# Patient Record
Sex: FEMALE | Race: WHITE | HISPANIC OR LATINO | Employment: OTHER | URBAN - METROPOLITAN AREA
[De-identification: names, ages, dates, MRNs, and addresses within clinical notes are randomized per-mention and may not be internally consistent; named-entity substitution may affect disease eponyms.]

---

## 2017-05-17 ENCOUNTER — HOSPITAL ENCOUNTER (EMERGENCY)
Facility: OTHER | Age: 57
Discharge: HOME | End: 2017-05-17
Attending: EMERGENCY MEDICINE
Payer: MEDICAID

## 2017-05-17 VITALS
TEMPERATURE: 99 F | BODY MASS INDEX: 32.25 KG/M2 | OXYGEN SATURATION: 94 % | WEIGHT: 182 LBS | HEIGHT: 63 IN | HEART RATE: 109 BPM | DIASTOLIC BLOOD PRESSURE: 75 MMHG | SYSTOLIC BLOOD PRESSURE: 134 MMHG | RESPIRATION RATE: 18 BRPM

## 2017-05-17 DIAGNOSIS — L03.211 FACIAL CELLULITIS: ICD-10-CM

## 2017-05-17 DIAGNOSIS — K04.7 DENTAL ABSCESS: Primary | ICD-10-CM

## 2017-05-17 PROCEDURE — 99283 EMERGENCY DEPT VISIT LOW MDM: CPT

## 2017-05-17 PROCEDURE — 25000003 PHARM REV CODE 250: Performed by: PHYSICIAN ASSISTANT

## 2017-05-17 RX ORDER — PENICILLIN V POTASSIUM 500 MG/1
500 TABLET, FILM COATED ORAL 4 TIMES DAILY
Qty: 28 TABLET | Refills: 0 | Status: SHIPPED | OUTPATIENT
Start: 2017-05-17 | End: 2017-05-24

## 2017-05-17 RX ORDER — IBUPROFEN 600 MG/1
600 TABLET ORAL EVERY 6 HOURS PRN
Qty: 20 TABLET | Refills: 0 | Status: SHIPPED | OUTPATIENT
Start: 2017-05-17

## 2017-05-17 RX ORDER — IBUPROFEN 600 MG/1
600 TABLET ORAL
Status: COMPLETED | OUTPATIENT
Start: 2017-05-17 | End: 2017-05-17

## 2017-05-17 RX ADMIN — IBUPROFEN 600 MG: 600 TABLET, FILM COATED ORAL at 09:05

## 2017-05-17 NOTE — ED TRIAGE NOTES
Patient c/o left lower dental pain and swelling since waking up this morning.  Patients caretaker brought patient in for evaluation.  No airway or breathing complaints.

## 2017-05-17 NOTE — ED NOTES
Discharge papers and prescriptions reviewed with patient and caregiver.  Patient & caregiver verbalized understanding.  Patient directed to discharge window.  Paperwork given to registration.

## 2017-05-17 NOTE — ED AVS SNAPSHOT
[facility]  [address]  [facility] [address]               Jazz Hernandez   2017  8:59 AM   ED    Description:  Female : 1960   Department:  [facility]           Your Care was Coordinated By:     Provider Role From To    Benjamin Zhu MD Attending Provider 1706 --    Cece Charles PA-C Physician Assistant 17 --      Reason for Visit     Dental Pain     Facial Swelling           Diagnoses this Visit        Comments    Dental abscess    -  Primary     Facial cellulitis           ED Disposition     None           To Do List           Follow-up Information     Follow up with [facility] In 2 days.    Specialties:  Behavioral Health, Psychiatry    Contact information:    [address] [address]  [phone]          Follow up with [facility].    Specialty:  Emergency Medicine    Why:  If symptoms worsen    Contact information:    [address] [facility] [address] [phone]       These Medications        Disp Refills Start End    penicillin v potassium (VEETID) 500 MG tablet 28 tablet 0 2017    Take 1 tablet (500 mg total) by mouth 4 (four) times daily. - Oral    ibuprofen (ADVIL,MOTRIN) 600 MG tablet 20 tablet 0 2017     Take 1 tablet (600 mg total) by mouth every 6 (six) hours as needed for Pain. - Oral      [facility]     [facility] Nurse Care Line -  Assistance  Unless otherwise directed by your provider, please contact [facility], our nurse care line that is available for  assistance.     Registered nurses in the [facility] provide: appointment scheduling, clinical advisement, health education, and other advisory services.  Call: [phone] (toll free)               Medications           START taking these NEW medications        Refills    penicillin v potassium (VEETID) 500 MG tablet  "0    Sig: Take 1 tablet (500 mg total) by mouth 4 (four) times daily.    Class: Print    Route: Oral    ibuprofen (ADVIL,MOTRIN) 600 MG tablet 0    Sig: Take 1 tablet (600 mg total) by mouth every 6 (six) hours as needed for Pain.    Class: Print    Route: Oral           Verify that the below list of medications is an accurate representation of the medications you are currently taking.  If none reported, the list may be blank. If incorrect, please contact your healthcare provider. Carry this list with you in case of emergency.           Current Medications     AMLODIPINE BESYLATE (NORVASC ORAL) Take by mouth.    benztropine (COGENTIN) 0.5 MG tablet Take 0.5 mg by mouth 2 (two) times daily.    ibuprofen (ADVIL,MOTRIN) 600 MG tablet Take 1 tablet (600 mg total) by mouth every 6 (six) hours as needed for Pain.    PALIPERIDONE PALMITATE (INVEGA SUSTENNA IM) Inject into the muscle.    penicillin v potassium (VEETID) 500 MG tablet Take 1 tablet (500 mg total) by mouth 4 (four) times daily.    triamcinolone acetonide 0.1% (KENALOG) 0.1 % ointment Apply topically 2 (two) times daily. Use until symptoms resolve.           Clinical Reference Information           Your Vitals Were     BP Pulse Temp Resp Height Weight    134/75 (BP Location: Left arm, Patient Position: Sitting) 109 98.8 °F (37.1 °C) (Oral) 18 5' 3" (1.6 m) 82.6 kg (182 lb)    SpO2 BMI             94% 32.24 kg/m2         Allergies as of 5/17/2017     No Known Allergies      Immunizations Administered on Date of Encounter - 5/17/2017     None      ED Micro, Lab, POCT     None      ED Imaging Orders     None      Discharge References/Attachments     DENTAL ABSCESS WITH FACIAL CELLULITIS (ENGLISH)      [facility] Sign-Up     Activating your [facility] account is as easy as 1-2-3!     1) Visit [email or web address], select Sign Up Now, enter this activation code and your date of birth, then select Next.  [email or web address]  Expires: 7/1/2017  9:15 AM      2) Create a username " and password to use when you visit [facility] in the future and select a security question in case you lose your password and select Next.    3) Enter your e-mail address and click Sign Up!    Additional Information  If you have questions, please e-mail [email or web address] or call [phone] to talk to our [facility] staff. Remember, [facility] is NOT to be used for urgent needs. For medical emergencies, dial 911.         Smoking Cessation     If you would like to quit smoking:   You may be eligible for free services if you are a [place] resident and started smoking cigarettes before September 1, 1988.  Call the Smoking Cessation Trust ([facility]) toll free at ([phone] or [phone].   Call 2-737-QUIT-NOW if you do not meet the above criteria.   Contact us via email: [email or web address]   View our website for more information: [email or web address]         [facility] complies with applicable Federal civil rights laws and does not discriminate on the basis of race, color, national origin, age, disability, or sex.        Language Assistance Services     ATTENTION: Language assistance services are available, free of charge. Please call [phone].      ATENCIÓN: Si habla telly, tiene a acosta disposición servicios gratuitos de asistencia lingüística. Llame al [phone].     CHÚ Ý: N?u b?n nói Ti?ng Vi?t, có các d?ch v? h? tr? ngôn ng? mi?n phí dành cho b?n. G?i s? [phone].

## 2017-05-17 NOTE — ED PROVIDER NOTES
Encounter Date: 5/17/2017       History     Chief Complaint   Patient presents with    Dental Pain     caretaker reports pt waking up this morning with left side of face swollen; pt has been c/o dental pain for the past few days    Facial Swelling     Review of patient's allergies indicates:  No Known Allergies  HPI Comments: Patient is a 57 y.o. female presenting to the emergency department with complaints of left-sided dental pain and facial swelling.  The patient's caretaker reports that yesterday she started complaining of left-sided dental pain to the lower side.  She states that when the patient awoke this morning, she had significant swelling to the face.  The patient states that she has a known dental fracture, and is in the process of getting into a dentist.  She denies difficulty swallowing, handling oral secretions, fever, chills.  The patient's caretaker states that they have been administering Tylenol with some relief.  She denies drainage in her mouth, denies swelling of her tongue or throat.    The history is provided by the patient.     Past Medical History:   Diagnosis Date    Depression      History reviewed. No pertinent surgical history.  History reviewed. No pertinent family history.  Social History   Substance Use Topics    Smoking status: Current Every Day Smoker    Smokeless tobacco: None    Alcohol use No     Review of Systems   Constitutional: Negative for activity change, appetite change, chills, fatigue and fever.   HENT: Positive for dental problem and facial swelling. Negative for congestion, rhinorrhea and sore throat.    Eyes: Negative for photophobia and visual disturbance.   Respiratory: Negative for cough, shortness of breath and wheezing.    Cardiovascular: Negative for chest pain.   Gastrointestinal: Negative for abdominal pain, diarrhea, nausea and vomiting.   Genitourinary: Negative for dysuria, hematuria and urgency.   Musculoskeletal: Negative for back pain, myalgias  and neck pain.   Skin: Negative for color change and wound.   Neurological: Negative for weakness and headaches.   Psychiatric/Behavioral: Negative for agitation and confusion.       Physical Exam   Initial Vitals   BP Pulse Resp Temp SpO2   05/17/17 0846 05/17/17 0846 05/17/17 0846 05/17/17 0846 05/17/17 0846   134/75 109 18 98.8 °F (37.1 °C) 94 %     Physical Exam    Nursing note and vitals reviewed.  Constitutional: She appears well-developed and well-nourished. She is not diaphoretic.  Non-toxic appearance. She does not have a sickly appearance. No distress.   Female accompanied by caretaker in the emergency department.  She is in no acute distress, speaking in clear and full sentences.  She makes eye contact.    HENT:   Head: Normocephalic and atraumatic.   Right Ear: External ear normal.   Left Ear: External ear normal.   Nose: Nose normal.   Mouth/Throat: Uvula is midline and oropharynx is clear and moist.       Overall poor dentition with many missing teeth and significant overall dental caries.  Tenderness to palpation of the left lower side with no palpable abscess. There is no palpable abscess, no trismus and no lingual elevation.  Edema of the left face with no submandibular edema.   Eyes: Conjunctivae and EOM are normal.   Neck: Normal range of motion. Neck supple.   Cardiovascular: Normal rate, regular rhythm and normal heart sounds.   Pulmonary/Chest: Breath sounds normal. No respiratory distress. She has no wheezes.   Musculoskeletal: Normal range of motion.   Neurological: She is alert and oriented to person, place, and time. GCS eye subscore is 4. GCS verbal subscore is 5. GCS motor subscore is 6.   Skin: Skin is warm.   Psychiatric: She has a normal mood and affect. Her behavior is normal. Judgment and thought content normal.         ED Course   Procedures  Labs Reviewed - No data to display          Medical Decision Making:   Other:   I have discussed this case with another health care  provider.       <> Summary of the Discussion: Dr. Carreon  This note was created using Dragon Medical Dictation. There may be typographical errors secondary to dictation.     Urgent evaluation of a 57 y.o. female presenting to the emergency department complaining of left sided dental pain. Patient is afebrile, nontoxic appearing and hemodynamically stable.  Physical exam reveals regular rate and rhythm, lungs are clear to auscultation bilaterally.  Left-sided facial swelling.  Overall significant poor dentition with multiple missing teeth and dental caries.  Tenderness palpation of the left lower side with no palpable abscess. There are no signs of Jesus's angina, lingual elevation, sublingual swelling, facial swelling, airway compromise, sepsis, dehydration, or a fluctuant abscess to drain.  The patient was given ibuprofen the emergency department.  She'll be discharged home with a prescription for ibuprofen and penicillin.  The patient and her caretaker were counseled on the importance of obtaining close follow-up with dentist.  Patient stable for discharge home. The patient was instructed to follow up with a primary care provider in 2 days or to return to the emergency department for worsening symptoms. The treatment plan was discussed with the patient who demonstrated understanding and comfort with plan. The patient's history, physical exam, and plan of care was discussed with and agreed upon with my supervising physician.     Past Medical History:   Diagnosis Date    Depression                      ED Course     Clinical Impression:     1. Dental abscess    2. Facial cellulitis       Disposition:   Disposition: Discharged  Condition: Stable       Cece Charles PA-C  05/17/17 0922

## 2019-08-26 ENCOUNTER — HOSPITAL ENCOUNTER (EMERGENCY)
Facility: OTHER | Age: 59
Discharge: HOME | End: 2019-08-26
Attending: EMERGENCY MEDICINE
Payer: MEDICAID

## 2019-08-26 VITALS
BODY MASS INDEX: 33.49 KG/M2 | WEIGHT: 182 LBS | RESPIRATION RATE: 18 BRPM | HEART RATE: 89 BPM | DIASTOLIC BLOOD PRESSURE: 97 MMHG | TEMPERATURE: 98 F | HEIGHT: 62 IN | OXYGEN SATURATION: 100 % | SYSTOLIC BLOOD PRESSURE: 136 MMHG

## 2019-08-26 DIAGNOSIS — L23.9 ALLERGIC DERMATITIS: Primary | ICD-10-CM

## 2019-08-26 LAB — POCT GLUCOSE: 80 MG/DL (ref 70–110)

## 2019-08-26 PROCEDURE — 82962 GLUCOSE BLOOD TEST: CPT

## 2019-08-26 PROCEDURE — 99284 EMERGENCY DEPT VISIT MOD MDM: CPT | Mod: 25

## 2019-08-26 PROCEDURE — 63600175 PHARM REV CODE 636 W HCPCS: Performed by: EMERGENCY MEDICINE

## 2019-08-26 PROCEDURE — 96372 THER/PROPH/DIAG INJ SC/IM: CPT

## 2019-08-26 RX ORDER — TRIAMCINOLONE ACETONIDE 1 MG/G
CREAM TOPICAL 2 TIMES DAILY
Qty: 80 G | Refills: 0 | Status: ON HOLD | OUTPATIENT
Start: 2019-08-26 | End: 2019-10-31 | Stop reason: HOSPADM

## 2019-08-26 RX ORDER — QUETIAPINE FUMARATE 300 MG/1
350 TABLET, FILM COATED ORAL
COMMUNITY

## 2019-08-26 RX ORDER — DIPHENHYDRAMINE HYDROCHLORIDE 50 MG/ML
25 INJECTION INTRAMUSCULAR; INTRAVENOUS
Status: COMPLETED | OUTPATIENT
Start: 2019-08-26 | End: 2019-08-26

## 2019-08-26 RX ORDER — HYDROXYZINE PAMOATE 50 MG/1
50 CAPSULE ORAL 4 TIMES DAILY
Qty: 30 CAPSULE | Refills: 0 | Status: ON HOLD | OUTPATIENT
Start: 2019-08-26 | End: 2019-10-31 | Stop reason: HOSPADM

## 2019-08-26 RX ORDER — DIVALPROEX SODIUM 500 MG/1
250 TABLET, DELAYED RELEASE ORAL EVERY 8 HOURS
COMMUNITY

## 2019-08-26 RX ORDER — METFORMIN HYDROCHLORIDE 500 MG/1
500 TABLET ORAL 2 TIMES DAILY WITH MEALS
COMMUNITY

## 2019-08-26 RX ORDER — METHYLPREDNISOLONE 4 MG/1
TABLET ORAL
Qty: 1 PACKAGE | Refills: 0 | Status: SHIPPED | OUTPATIENT
Start: 2019-08-26 | End: 2019-09-16

## 2019-08-26 RX ORDER — DEXAMETHASONE SODIUM PHOSPHATE 4 MG/ML
8 INJECTION, SOLUTION INTRA-ARTICULAR; INTRALESIONAL; INTRAMUSCULAR; INTRAVENOUS; SOFT TISSUE
Status: COMPLETED | OUTPATIENT
Start: 2019-08-26 | End: 2019-08-26

## 2019-08-26 RX ADMIN — DIPHENHYDRAMINE HYDROCHLORIDE 25 MG: 50 INJECTION, SOLUTION INTRAMUSCULAR; INTRAVENOUS at 10:08

## 2019-08-26 RX ADMIN — DEXAMETHASONE SODIUM PHOSPHATE 8 MG: 4 INJECTION, SOLUTION INTRAMUSCULAR; INTRAVENOUS at 10:08

## 2019-08-26 NOTE — ED PROVIDER NOTES
Encounter Date: 8/26/2019    SCRIBE #1 NOTE: Geraldo SOLARES am scribing for, and in the presence of, Dr. Bowling.       History     Chief Complaint   Patient presents with    Allergic Reaction     facial swelling x 1 week. denies any new medication. unimprovd with benadryl. pt unable to fully open mouth due to swelling. maintaining airway. denies SOB or difficulty swallowing     Time seen by provider: 9:55 AM    This is a 59 y.o. female who presents with complaint of allergic reaction that began approximately one week ago. The patient reports that she has been experiencing facial swelling and tightness. The facial swelling and tightness is causing her to be unable to open her mouth fully. She is also experiencing a rash to her neck and upper chest wall that is painful. The patient's nurse reports that she has been taking benadryl with no relief of her symptoms. She denies fever, sore throat, chest pain, shortness of breath, nausea, and dysuria.     The history is provided by the patient.     Review of patient's allergies indicates:  No Known Allergies  Past Medical History:   Diagnosis Date    Depression      No past surgical history on file.  No family history on file.  Social History     Tobacco Use    Smoking status: Current Every Day Smoker   Substance Use Topics    Alcohol use: No    Drug use: No     Review of Systems   Constitutional: Negative for fever.   HENT: Positive for facial swelling (and tightness). Negative for sore throat.    Respiratory: Negative for shortness of breath.    Cardiovascular: Negative for chest pain.   Gastrointestinal: Negative for nausea.   Genitourinary: Negative for dysuria.   Musculoskeletal: Negative for back pain.   Skin: Positive for rash (to the neck and chest wall). Negative for color change and wound.   Neurological: Negative for weakness.   Hematological: Does not bruise/bleed easily.   All other systems reviewed and are negative.      Physical Exam     Initial Vitals  [08/26/19 0932]   BP Pulse Resp Temp SpO2   (Abnormal) 136/97 89 18 97.7 °F (36.5 °C) 100 %      MAP       --         Physical Exam    Nursing note and vitals reviewed.  Constitutional: She appears well-developed and well-nourished. No distress.   HENT:   Head: Normocephalic and atraumatic.   Mouth/Throat: Oropharynx is clear and moist.   Mild periorbital edema.    Eyes: Conjunctivae and EOM are normal. Pupils are equal, round, and reactive to light. Right eye exhibits no discharge. Left eye exhibits no discharge. No scleral icterus.   No eye involvement.    Neck: Normal range of motion. Neck supple.   Cardiovascular: Normal rate, regular rhythm and normal heart sounds. Exam reveals no gallop and no friction rub.    No murmur heard.  Pulmonary/Chest: Breath sounds normal. No stridor. No respiratory distress. She has no wheezes. She has no rhonchi. She has no rales.   Abdominal: Soft. Bowel sounds are normal. She exhibits no distension and no mass. There is no tenderness. There is no rebound and no guarding.   Musculoskeletal: She exhibits no edema.   Neurological: She is alert and oriented to person, place, and time. She has normal strength. No cranial nerve deficit or sensory deficit. GCS score is 15. GCS eye subscore is 4. GCS verbal subscore is 5. GCS motor subscore is 6.   Skin: Skin is warm and dry. Capillary refill takes less than 2 seconds.   Erythema and skin thickening of the face and left anterior neck. With celation of the lips.    Psychiatric: She has a normal mood and affect. Her behavior is normal. Judgment and thought content normal.         ED Course   Procedures  Labs Reviewed   POCT GLUCOSE          Imaging Results    None          Medical Decision Making:   Initial Assessment:   59 y.o female with localized allergic reaction to face and neck. Possible exposure for contact dermatitis. Some improvement with antihistamines. Plan to treat with steroids, topical steroids, and continue with  antihistamines.   Clinical Tests:   Lab Tests: Ordered and Reviewed            Scribe Attestation:   Scribe #1: I performed the above scribed service and the documentation accurately describes the services I performed. I attest to the accuracy of the note.    Attending Attestation:           Physician Attestation for Scribe:  Physician Attestation Statement for Scribe #1: I, Dr. Bowling, reviewed documentation, as scribed by Geraldo robledo  in my presence, and it is both accurate and complete.                    Clinical Impression:     1. Allergic dermatitis                                 Saranya Bowling MD  08/27/19 2035

## 2019-08-30 LAB — POCT GLUCOSE: 151 MG/DL (ref 70–110)

## 2019-10-28 ENCOUNTER — HOSPITAL ENCOUNTER (INPATIENT)
Facility: OTHER | Age: 59
LOS: 3 days | Discharge: HOME | DRG: 607 | End: 2019-10-31
Attending: EMERGENCY MEDICINE | Admitting: EMERGENCY MEDICINE
Payer: MEDICARE

## 2019-10-28 DIAGNOSIS — T50.905A ADVERSE EFFECT OF DRUG, INITIAL ENCOUNTER: Primary | ICD-10-CM

## 2019-10-28 DIAGNOSIS — L24.9 IRRITANT CONTACT DERMATITIS, UNSPECIFIED TRIGGER: ICD-10-CM

## 2019-10-28 LAB
ALBUMIN SERPL BCP-MCNC: 3.7 G/DL (ref 3.5–5.2)
ALP SERPL-CCNC: 85 U/L (ref 55–135)
ALT SERPL W/O P-5'-P-CCNC: 19 U/L (ref 10–44)
AMPHET+METHAMPHET UR QL: NEGATIVE
ANION GAP SERPL CALC-SCNC: 11 MMOL/L (ref 8–16)
AST SERPL-CCNC: 15 U/L (ref 10–40)
BARBITURATES UR QL SCN>200 NG/ML: NEGATIVE
BASOPHILS # BLD AUTO: 0.05 K/UL (ref 0–0.2)
BASOPHILS NFR BLD: 0.3 % (ref 0–1.9)
BENZODIAZ UR QL SCN>200 NG/ML: NEGATIVE
BILIRUB SERPL-MCNC: 0.3 MG/DL (ref 0.1–1)
BILIRUB UR QL STRIP: NEGATIVE
BUN SERPL-MCNC: 6 MG/DL (ref 6–20)
BZE UR QL SCN: NEGATIVE
CALCIUM SERPL-MCNC: 9.5 MG/DL (ref 8.7–10.5)
CANNABINOIDS UR QL SCN: NEGATIVE
CHLORIDE SERPL-SCNC: 104 MMOL/L (ref 95–110)
CK SERPL-CCNC: 116 U/L (ref 20–180)
CLARITY UR: CLEAR
CO2 SERPL-SCNC: 25 MMOL/L (ref 23–29)
COLOR UR: YELLOW
CREAT SERPL-MCNC: 0.7 MG/DL (ref 0.5–1.4)
CREAT UR-MCNC: 17.4 MG/DL (ref 15–325)
CRP SERPL-MCNC: 14.7 MG/L (ref 0–8.2)
DIFFERENTIAL METHOD: ABNORMAL
EOSINOPHIL # BLD AUTO: 1 K/UL (ref 0–0.5)
EOSINOPHIL NFR BLD: 6.5 % (ref 0–8)
ERYTHROCYTE [DISTWIDTH] IN BLOOD BY AUTOMATED COUNT: 13.2 % (ref 11.5–14.5)
ERYTHROCYTE [SEDIMENTATION RATE] IN BLOOD: 18 MM/HR (ref 0–20)
EST. GFR  (AFRICAN AMERICAN): >60 ML/MIN/1.73 M^2
EST. GFR  (NON AFRICAN AMERICAN): >60 ML/MIN/1.73 M^2
GLUCOSE SERPL-MCNC: 95 MG/DL (ref 70–110)
GLUCOSE UR QL STRIP: NEGATIVE
HCT VFR BLD AUTO: 36.7 % (ref 37–48.5)
HGB BLD-MCNC: 12 G/DL (ref 12–16)
HGB UR QL STRIP: NEGATIVE
IMM GRANULOCYTES # BLD AUTO: 0.09 K/UL (ref 0–0.04)
IMM GRANULOCYTES NFR BLD AUTO: 0.6 % (ref 0–0.5)
INR PPP: 0.9 (ref 0.8–1.2)
KETONES UR QL STRIP: NEGATIVE
LACTATE SERPL-SCNC: 2.2 MMOL/L (ref 0.5–2.2)
LEUKOCYTE ESTERASE UR QL STRIP: NEGATIVE
LITHIUM SERPL-SCNC: <0.1 MMOL/L (ref 0.6–1.2)
LYMPHOCYTES # BLD AUTO: 3.1 K/UL (ref 1–4.8)
LYMPHOCYTES NFR BLD: 20.2 % (ref 18–48)
MCH RBC QN AUTO: 29.3 PG (ref 27–31)
MCHC RBC AUTO-ENTMCNC: 32.7 G/DL (ref 32–36)
MCV RBC AUTO: 90 FL (ref 82–98)
METHADONE UR QL SCN>300 NG/ML: NEGATIVE
MONOCYTES # BLD AUTO: 2.2 K/UL (ref 0.3–1)
MONOCYTES NFR BLD: 14.5 % (ref 4–15)
NEUTROPHILS # BLD AUTO: 8.9 K/UL (ref 1.8–7.7)
NEUTROPHILS NFR BLD: 57.9 % (ref 38–73)
NITRITE UR QL STRIP: NEGATIVE
NRBC BLD-RTO: 0 /100 WBC
OPIATES UR QL SCN: NEGATIVE
PCP UR QL SCN>25 NG/ML: NEGATIVE
PH UR STRIP: 7 [PH] (ref 5–8)
PLATELET # BLD AUTO: 242 K/UL (ref 150–350)
PMV BLD AUTO: 12.4 FL (ref 9.2–12.9)
POCT GLUCOSE: 146 MG/DL (ref 70–110)
POTASSIUM SERPL-SCNC: 4 MMOL/L (ref 3.5–5.1)
PROT SERPL-MCNC: 7.2 G/DL (ref 6–8.4)
PROT UR QL STRIP: NEGATIVE
PROTHROMBIN TIME: 10.2 SEC (ref 9–12.5)
RBC # BLD AUTO: 4.09 M/UL (ref 4–5.4)
SODIUM SERPL-SCNC: 140 MMOL/L (ref 136–145)
SP GR UR STRIP: <=1.005 (ref 1–1.03)
TOXICOLOGY INFORMATION: NORMAL
TSH SERPL DL<=0.005 MIU/L-ACNC: 2.29 UIU/ML (ref 0.4–4)
URN SPEC COLLECT METH UR: ABNORMAL
UROBILINOGEN UR STRIP-ACNC: NEGATIVE EU/DL
VALPROATE SERPL-MCNC: 71.8 UG/ML (ref 50–100)
WBC # BLD AUTO: 15.29 K/UL (ref 3.9–12.7)

## 2019-10-28 PROCEDURE — 99285 EMERGENCY DEPT VISIT HI MDM: CPT | Mod: 25

## 2019-10-28 PROCEDURE — 85651 RBC SED RATE NONAUTOMATED: CPT

## 2019-10-28 PROCEDURE — 86140 C-REACTIVE PROTEIN: CPT

## 2019-10-28 PROCEDURE — 80053 COMPREHEN METABOLIC PANEL: CPT

## 2019-10-28 PROCEDURE — 96361 HYDRATE IV INFUSION ADD-ON: CPT

## 2019-10-28 PROCEDURE — 99223 PR INITIAL HOSPITAL CARE,LEVL III: ICD-10-PCS | Mod: ,,, | Performed by: NURSE PRACTITIONER

## 2019-10-28 PROCEDURE — 96374 THER/PROPH/DIAG INJ IV PUSH: CPT

## 2019-10-28 PROCEDURE — 85610 PROTHROMBIN TIME: CPT

## 2019-10-28 PROCEDURE — 94761 N-INVAS EAR/PLS OXIMETRY MLT: CPT

## 2019-10-28 PROCEDURE — 83605 ASSAY OF LACTIC ACID: CPT

## 2019-10-28 PROCEDURE — 99223 1ST HOSP IP/OBS HIGH 75: CPT | Mod: ,,, | Performed by: NURSE PRACTITIONER

## 2019-10-28 PROCEDURE — 85025 COMPLETE CBC W/AUTO DIFF WBC: CPT

## 2019-10-28 PROCEDURE — 84443 ASSAY THYROID STIM HORMONE: CPT

## 2019-10-28 PROCEDURE — 82550 ASSAY OF CK (CPK): CPT

## 2019-10-28 PROCEDURE — 87040 BLOOD CULTURE FOR BACTERIA: CPT

## 2019-10-28 PROCEDURE — 63600175 PHARM REV CODE 636 W HCPCS: Performed by: NURSE PRACTITIONER

## 2019-10-28 PROCEDURE — 80164 ASSAY DIPROPYLACETIC ACD TOT: CPT

## 2019-10-28 PROCEDURE — 11000001 HC ACUTE MED/SURG PRIVATE ROOM

## 2019-10-28 PROCEDURE — 36415 COLL VENOUS BLD VENIPUNCTURE: CPT

## 2019-10-28 PROCEDURE — 80307 DRUG TEST PRSMV CHEM ANLYZR: CPT

## 2019-10-28 PROCEDURE — 80178 ASSAY OF LITHIUM: CPT

## 2019-10-28 PROCEDURE — 81003 URINALYSIS AUTO W/O SCOPE: CPT

## 2019-10-28 PROCEDURE — 63600175 PHARM REV CODE 636 W HCPCS: Performed by: EMERGENCY MEDICINE

## 2019-10-28 RX ORDER — IBUPROFEN 200 MG
24 TABLET ORAL
Status: DISCONTINUED | OUTPATIENT
Start: 2019-10-28 | End: 2019-10-31 | Stop reason: HOSPADM

## 2019-10-28 RX ORDER — SODIUM CHLORIDE 0.9 % (FLUSH) 0.9 %
10 SYRINGE (ML) INJECTION
Status: DISCONTINUED | OUTPATIENT
Start: 2019-10-28 | End: 2019-10-31 | Stop reason: HOSPADM

## 2019-10-28 RX ORDER — ONDANSETRON 2 MG/ML
4 INJECTION INTRAMUSCULAR; INTRAVENOUS EVERY 8 HOURS PRN
Status: DISCONTINUED | OUTPATIENT
Start: 2019-10-28 | End: 2019-10-28

## 2019-10-28 RX ORDER — INSULIN ASPART 100 [IU]/ML
0-5 INJECTION, SOLUTION INTRAVENOUS; SUBCUTANEOUS
Status: DISCONTINUED | OUTPATIENT
Start: 2019-10-28 | End: 2019-10-31 | Stop reason: HOSPADM

## 2019-10-28 RX ORDER — SODIUM CHLORIDE 9 MG/ML
1000 INJECTION, SOLUTION INTRAVENOUS
Status: COMPLETED | OUTPATIENT
Start: 2019-10-28 | End: 2019-10-28

## 2019-10-28 RX ORDER — ENOXAPARIN SODIUM 100 MG/ML
40 INJECTION SUBCUTANEOUS EVERY 24 HOURS
Status: DISCONTINUED | OUTPATIENT
Start: 2019-10-28 | End: 2019-10-31 | Stop reason: HOSPADM

## 2019-10-28 RX ORDER — SODIUM CHLORIDE 9 MG/ML
INJECTION, SOLUTION INTRAVENOUS CONTINUOUS
Status: DISCONTINUED | OUTPATIENT
Start: 2019-10-28 | End: 2019-10-30

## 2019-10-28 RX ORDER — AMLODIPINE BESYLATE 5 MG/1
5 TABLET ORAL DAILY
Status: DISCONTINUED | OUTPATIENT
Start: 2019-10-29 | End: 2019-10-31 | Stop reason: HOSPADM

## 2019-10-28 RX ORDER — BISMUTH SUBSALICYLATE 525 MG/30ML
15 LIQUID ORAL EVERY 6 HOURS PRN
COMMUNITY

## 2019-10-28 RX ORDER — HYDROCODONE BITARTRATE AND ACETAMINOPHEN 5; 325 MG/1; MG/1
1 TABLET ORAL EVERY 4 HOURS PRN
Status: DISCONTINUED | OUTPATIENT
Start: 2019-10-28 | End: 2019-10-31 | Stop reason: HOSPADM

## 2019-10-28 RX ORDER — IBUPROFEN 200 MG
16 TABLET ORAL
Status: DISCONTINUED | OUTPATIENT
Start: 2019-10-28 | End: 2019-10-31 | Stop reason: HOSPADM

## 2019-10-28 RX ORDER — HYDROMORPHONE HYDROCHLORIDE 1 MG/ML
1 INJECTION, SOLUTION INTRAMUSCULAR; INTRAVENOUS; SUBCUTANEOUS EVERY 4 HOURS PRN
Status: DISCONTINUED | OUTPATIENT
Start: 2019-10-28 | End: 2019-10-31 | Stop reason: HOSPADM

## 2019-10-28 RX ORDER — MORPHINE SULFATE 4 MG/ML
4 INJECTION, SOLUTION INTRAMUSCULAR; INTRAVENOUS
Status: COMPLETED | OUTPATIENT
Start: 2019-10-28 | End: 2019-10-28

## 2019-10-28 RX ORDER — ACETAMINOPHEN 325 MG/1
650 TABLET ORAL EVERY 4 HOURS PRN
Status: DISCONTINUED | OUTPATIENT
Start: 2019-10-28 | End: 2019-10-31 | Stop reason: HOSPADM

## 2019-10-28 RX ORDER — GLUCAGON 1 MG
1 KIT INJECTION
Status: DISCONTINUED | OUTPATIENT
Start: 2019-10-28 | End: 2019-10-31 | Stop reason: HOSPADM

## 2019-10-28 RX ORDER — SIMVASTATIN 40 MG/1
40 TABLET, FILM COATED ORAL NIGHTLY
COMMUNITY

## 2019-10-28 RX ADMIN — SODIUM CHLORIDE 1000 ML: 0.9 INJECTION, SOLUTION INTRAVENOUS at 11:10

## 2019-10-28 RX ADMIN — MORPHINE SULFATE 4 MG: 4 INJECTION, SOLUTION INTRAMUSCULAR; INTRAVENOUS at 10:10

## 2019-10-28 RX ADMIN — SODIUM CHLORIDE: 0.9 INJECTION, SOLUTION INTRAVENOUS at 09:10

## 2019-10-28 NOTE — ED NOTES
Rounding completed on pt. PT resting in stretcher in NAD with even, unlabored respirations. Report 8/10 pain at this time. NS infusing to left AC @ 125/hr. Pt and caregivers at bedside updated on plan of care. Provided pt coffee per request

## 2019-10-28 NOTE — ED TRIAGE NOTES
Pt reports to ED escorted by caregiver from [facility] c/o of facial swelling and redness/swelling to bilateral forearms and hands x 3 days. Caregiver states symptoms started x 3 days. Pt speaks broken english and attempting to give history, requesting creams for rash. Pt denies any new or change in medications. Pt denies chest pain, SOB, oral swelling, throat discomofrt, fevers, chills, or N/V/D. Periorbital redness/swelling noted. Lips dry and cracked.  Patient identifiers for Jazz Hernandez checked and correct.  LOC: Patient is awake, alert, and aware of environment with an appropriate affect. Patient is oriented x 3 and speaking appropriately.  APPEARANCE: Patient resting comfortably and in no acute distress. Patient is clean and well groomed, patient's clothing is properly fastened.  SKIN: The skin is warm. Nonintact, weeping skin noted to bilateral forearms. Patient has normal skin turgor and dry mucus membrances.   MUSKULOSKELETAL: Patient is moving all extremities well. Pulses intact.   RESPIRATORY: Airway is open and patent. Respirations are spontaneous, even and unlabored. Normal effort and rate noted.  CARDIAC: Patient has a normal rate and rhythm. No peripheral edema noted. Capillary refill < 3 seconds.  ABDOMEN: No distention noted. Bowel sounds active in all 4 quadrants. Soft and non-tender upon palpation.    Allergies reported: Review of patient's allergies indicates:  No Known Allergies

## 2019-10-28 NOTE — ED NOTES
Rounding completed on pt. Pt refusing transfer to main campus but  at bedside states pt will not refuse transfer. Provided pt coffee per request. Pt reports pain improved, now currently a 8/10. No change in facial swelling or swelling/redness to arms. Clear weeping continued.

## 2019-10-28 NOTE — PLAN OF CARE
[facility] Transfer Acceptance Note    FOR [facility] MARCO ANTONIO MCINTOSH -  Please call extension [phone] (if nobody answers, this will flip to a beeper, so put in your call back number) upon patient arrival to floor for Hospital Medicine admit team assignment and for additional admit orders for the patient.  Do not page the attending, staff physician associate with the patient on arrival (may not be in-house at the time of arrival).  Rather, always call [phone] to reach the triage physician for orders and team assignment.     Transferring Facility/Hospital: [facility]     Referring Provider/Specialty giving report: Dr. Shereen Johnston (emergency med)    Accepting Physician for admission to hospital  Elmer Pradhan MD    Date of acceptance:  10/28/2019   12:00 PM      Patients name: Jazz Hernandez     Allergies:Review of patient's allergies indicates:  No Known Allergies     Reason for transfer:  Dermatology consultation     Overview/ Report from Physician/Mid-Level Provider:    HPI:  60 Y/O  WOMAN, from [facility] of facial swelling/redness/swelling to forearms, who is described as having Desquamation of bilateral palms, progressively worse, diffuse erythema and swelling over dorsum of bilateral forearms, no mucuous membranes involved, VS stable.   No issues with iv access or phlebotomy to obtain labs reported    Desquamation around the face, faint peace-orbital swelling. Dr. Johnston denies any concern or signs of respiratory compromise, no stridor, speech changes, no wheezing.  In August skin tightening in anterior chest reported, so unclear if this is progression of an underlying subacute process.     No respiratory comprise,  swollen over both hands, no evidence of compartment syndrome.  Report of no mucosal involvement including exam of anogenital areas.     Given IV fluids, 1L NS and morphine 4mg IV x 1 for pain.        Meds - includes Depakote, Invega, metformin, vitamin D, Pepto bismol,  "Ibuprofen, Zantac, Norvasc, Cogentin, Seroquel, and Zocor..     VS: BP (!) 170/76   Pulse 95   Temp 97.8 °F (36.6 °C) (Oral)   Resp 20   Ht 5' 2" (1.575 m)   Wt 85.3 kg (188 lb)   SpO2 100%   BMI 34.39 kg/m²       Labs: see epic  Lab Results   Component Value Date    WBC 15.29 (H) 10/28/2019    HGB 12.0 10/28/2019    HCT 36.7 (L) 10/28/2019    MCV 90 10/28/2019     10/28/2019     CMP - all values WNL     Radiographs:     To Do List upon arrival:    1. Consult dermatology to eval urgently for possibility of TEN. Primary concern from ED physician.   2. Would Hold most home medications - pt is hypertensive so consider continuing amlodipine   3. Wound care consult   4. F/u blood cultures - trend CMP daily ,  Initial differential w/o eosinophilia but trend CBC with diff,to monitor for eosinophilia.   5. Requested blood cultures to be drawn and ESR/CRP to be sent.              Elmer Pradhan M.D.  Attending Physician  [facility] Medicine Dept.  Pager: [phone]      "

## 2019-10-28 NOTE — ED PROVIDER NOTES
CHIEF COMPLAINT  Chief Complaint   Patient presents with    Facial Swelling     Facial swelling x 3 days; denies SOB.        HPI  Jazz Hernandez is a 59 y.o. female who presents with facial swelling and redness that began three days ago and is not alleviating. Her caregiver reports when she last saw the patient three days ago she was normal. The patient also reports lip swelling. The patient reports she has had this happen in the past. The patient is a resident at [facility]. The patient has a list of medications that she takes that includes Depakote, Invega, metformin, vitamin D, Pepto bismol, Ibuprofen, Zantac, Norvasc, Cogentin, Seroquel, and Zocor.    This is the extent of the patient's complaints at this time.       PAST MEDICAL HISTORY  Past Medical History:   Diagnosis Date    Depression        CURRENT MEDICATIONS    No current facility-administered medications for this encounter.     Current Outpatient Medications:     AMLODIPINE BESYLATE (NORVASC ORAL), Take 5 mg by mouth. , Disp: , Rfl:     bismuth subsalicylate (PEPTO BISMOL) 262 mg/15 mL suspension, Take 15 mLs by mouth every 6 (six) hours as needed for Indigestion., Disp: , Rfl:     divalproex (DEPAKOTE) 500 MG TbEC, Take 250 mg by mouth every 8 (eight) hours., Disp: , Rfl:     metFORMIN (GLUCOPHAGE) 500 MG tablet, Take 500 mg by mouth 2 (two) times daily with meals., Disp: , Rfl:     QUEtiapine (SEROQUEL) 300 MG Tab, Take 350 mg by mouth., Disp: , Rfl:     ranitidine (ZANTAC) 150 MG capsule, Take 150 mg by mouth 2 (two) times daily., Disp: , Rfl:     simvastatin (ZOCOR) 40 MG tablet, Take 40 mg by mouth every evening., Disp: , Rfl:     acetaminophen (TYLENOL) 325 MG tablet, Take 2 tablets (650 mg total) by mouth every 4 (four) hours as needed., Disp: , Rfl: 0    betamethasone dipropionate (DIPROLENE) 0.05 % ointment, Apply topically 2 (two) times daily., Disp: 45 g, Rfl: 3    benztropine (COGENTIN) 0.5 MG tablet, Take 0.5 mg by  mouth 2 (two) times daily., Disp: , Rfl:     hydrocortisone 2.5 % cream, Apply topically 2 (two) times daily. To facial involvement of rash until resolution, Disp: 28 g, Rfl: 3    hydrOXYzine pamoate (VISTARIL) 25 MG Cap, Take 1 capsule (25 mg total) by mouth every 8 (eight) hours as needed (itching)., Disp: 60 capsule, Rfl: 2    ibuprofen (ADVIL,MOTRIN) 600 MG tablet, Take 1 tablet (600 mg total) by mouth every 6 (six) hours as needed for Pain., Disp: 20 tablet, Rfl: 0    PALIPERIDONE PALMITATE (INVEGA SUSTENNA IM), Inject into the muscle., Disp: , Rfl:     white petrolatum (VASELINE) ointment, Apply topically as needed for Dry Skin. Once a day to rash on hands, and arms., Disp: , Rfl: 0    ALLERGIES    Review of patient's allergies indicates:  No Known Allergies    SURGICAL HISTORY    History reviewed. No pertinent surgical history.    SOCIAL HISTORY    Social History     Socioeconomic History    Marital status: Single     Spouse name: Not on file    Number of children: Not on file    Years of education: Not on file    Highest education level: Not on file   Occupational History    Not on file   Social Needs    Financial resource strain: Not on file    Food insecurity:     Worry: Not on file     Inability: Not on file    Transportation needs:     Medical: Not on file     Non-medical: Not on file   Tobacco Use    Smoking status: Current Every Day Smoker   Substance and Sexual Activity    Alcohol use: No    Drug use: No    Sexual activity: Not on file   Lifestyle    Physical activity:     Days per week: Not on file     Minutes per session: Not on file    Stress: Not on file   Relationships    Social connections:     Talks on phone: Not on file     Gets together: Not on file     Attends Hindu service: Not on file     Active member of club or organization: Not on file     Attends meetings of clubs or organizations: Not on file     Relationship status: Not on file   Other Topics Concern    Not  "on file   Social History Narrative    Not on file       FAMILY HISTORY    History reviewed. No pertinent family history.    REVIEW OF SYSTEMS    Constitutional:  No fever or weakness.   Eyes:  No redness, pain, or discharge.   HENT:  Facial swelling. Facial redness. No tongue or lip swelling. No ear pain, no sudden onset headache, no throat pain.  Respiratory:  No wheezing, cough, or shortness of breath.   Cardiovascular:  No chest pain or palpitations.  GI:  No abdominal pain, nausea, vomiting, or diarrhea.   : No dysuria or discharge.  Musculoskeletal:  No injury; full range of motion.   Skin:  Rash of bilateral forearms  Psychiatric: No suicidal ideations, homicidal ideations, auditory or visual hallucinations  Neurologic:  No focal weakness or sensory changes.   All Systems otherwise negative except as noted in the Review of Systems and History of Present Illness.    PHYSICAL EXAM    VITAL SIGNS: BP (!) 193/83 (BP Location: Right leg, Patient Position: Sitting)   Pulse 100   Temp 98 °F (36.7 °C)   Resp 17   Ht 5' 2" (1.575 m)   Wt 88.8 kg (195 lb 12.3 oz)   SpO2 99%   BMI 35.81 kg/m²    Constitutional:  Uncomfortable, [place]ese speaking but speaks English   HENT:  Dry skin around the lip but no desquamation or tongue swelling. Normocephalic, atraumatic.  Normal ears, nose, and throat.  Eyes:  PERRL, EOMI, conjunctiva normal.  Neck: Normal range of motion, no tenderness, supple.  Respiratory:  Nonlabored breathing with normal breath sounds; no respiratory distress.  Cardiovascular:  RRR with no pulse deficit.  : normal external genitalia. No erythema or desquamation of the labia majora.  GI:  Soft, nontender, no rebound or guarding.  Musculoskeletal: Normal ROM, no tenderness, injury, edema.  Integument:  Desquamation of the palms of the left and right hand. Erythema and swelling over bilateral forearms, she has full  strength bilaterally.   Neurologic:  Normal motor, sensation with no focal " deficit.  Psychiatric:  Affect normal, Judgment normal, Mood normal. No SI, HI and not gravely disabled.    LABS  Pertinent labs reviewed. (See chart for details)   Labs Reviewed   CBC W/ AUTO DIFFERENTIAL - Abnormal; Notable for the following components:       Result Value    WBC 15.29 (*)     Hematocrit 36.7 (*)     Immature Granulocytes 0.6 (*)     Gran # (ANC) 8.9 (*)     Immature Grans (Abs) 0.09 (*)     Mono # 2.2 (*)     Eos # 1.0 (*)     All other components within normal limits   URINALYSIS - Abnormal; Notable for the following components:    Specific Gravity, UA <=1.005 (*)     All other components within normal limits   LITHIUM LEVEL - Abnormal; Notable for the following components:    Lithium Level <0.1 (*)     All other components within normal limits   C-REACTIVE PROTEIN - Abnormal; Notable for the following components:    CRP 14.7 (*)     All other components within normal limits   COMPREHENSIVE METABOLIC PANEL   DRUG SCREEN PANEL, URINE EMERGENCY   LACTIC ACID, PLASMA   PROTIME-INR   TSH   VALPROIC ACID   CK   SEDIMENTATION RATE         EKG    No results found for this or any previous visit.  EKG interpreted by ED MD         RADIOLOGY    No orders to display            PROCEDURES    Procedures    Medications   morphine injection 4 mg (4 mg Intravenous Given 10/28/19 1042)   0.9%  NaCl infusion (0 mLs Intravenous Stopped 10/28/19 1622)       ED COURSE & MEDICAL DECISION MAKING      Pertinent & Imaging studies reviewed. (See chart for details)    Differential Diagnosis: Most likely drug reaction, TENS/SJS spectrum, DRESS, would eval for renal failure, rhabdomyolysis. No evidence of compartment syndrome but definitely has alarming desquamation.    ED Course as of Nov 01 2323   Mon Oct 28, 2019   1008 WBC(!): 15.29 [MG]   1008 Hematocrit(!): 36.7 [MG]   1103 Will need observation given rash, likely drug related    [MG]   1105 Calling transfer center    [MG]   1118 Awaiting callback from transfer center     [MG]   1140 Specific Gravity, UA(!): <=1.005 [MG]   1140 TSH: 2.290 [MG]   1140 Protime: 10.2 [MG]   1140 Lactate, Prabhu: 2.2 [MG]   1144 Patient to be transferred to Dr Pradhan at [facility] for derm    [MG]   [address] [phone] (this is the [facility])    [MG]   1315 Dr Chamorro [phone] (arranges for transportation from [facility])    [MG]   1109 Due to crowding and prolonged time before bed at [facility] is available, I discussed with transfer center possibility of transferring ED to ED instead.  They will call me back after they discuss with accepting MD.    [AK]      ED Course User Index  [AK] Rosa Elena Dill MD  [MG] Shereen Johnston MD         Discharge Medication List as of 10/31/2019 10:44 AM      STOP taking these medications       triamcinolone acetonide 0.1% (KENALOG) 0.1 % cream Comments:   Reason for Stopping:               Discharge Medication List as of 10/31/2019 10:44 AM      START taking these medications    Details   acetaminophen (TYLENOL) 325 MG tablet Take 2 tablets (650 mg total) by mouth every 4 (four) hours as needed., Starting Thu 10/31/2019, OTC      hydrocortisone 2.5 % cream Apply topically 2 (two) times daily. To facial involvement of rash until resolution, Starting Thu 10/31/2019, Normal      white petrolatum (VASELINE) ointment Apply topically as needed for Dry Skin. Once a day to rash on hands, and arms., Starting Thu 10/31/2019, OTC      clobetasol (TEMOVATE) 0.05 % Gel Apply topically 2 (two) times daily., Starting Thu 10/31/2019, Normal               OVERALL IMPRESSION:     60 yo F here with desquamating rash of bilateral palms and alarming swelling of both forearms, will need observation as could progress to compartment syndrome. Will need dermatology. Have held off on medications here, aside from pain meds and fluids given concern for medication reaction.    FINAL DIAGNOSIS  1. Adverse effect of drug, initial encounter    2. Irritant contact  dermatitis, unspecified trigger        DISPOSITION  Patient transferred in stable condition.    Critical care time spent with this patient (not including billable procedures) was 20 minutes.    Shereen Johnston MD  Emergency Medicine  [facility]  10/28/2019 9:05 AM    I have reviewed the notes, assessments, and/or procedures performed by Glen hoyos and agree with documentation of this patient    Please pardon typos or dictation errors, as this note was transcribed using M*Modal fluency direct dictation software.         Shereen Johnston MD  10/28/19 1254       Shereen Johnston MD  10/28/19 4795       Sheeren Johnston MD  11/01/19 4058

## 2019-10-28 NOTE — ED NOTES
[facility] called for update on pt status regarding transfer request. Per Rachelle, pt is awaiting bed placement before transport can be called.

## 2019-10-29 PROBLEM — I10 ESSENTIAL HYPERTENSION: Status: ACTIVE | Noted: 2019-10-29

## 2019-10-29 PROBLEM — E78.5 HYPERLIPIDEMIA: Status: ACTIVE | Noted: 2019-10-29

## 2019-10-29 PROBLEM — R21 RASH AND NONSPECIFIC SKIN ERUPTION: Status: ACTIVE | Noted: 2019-10-28

## 2019-10-29 PROBLEM — D72.829 LEUKOCYTOSIS: Status: ACTIVE | Noted: 2019-10-29

## 2019-10-29 LAB
ALBUMIN SERPL BCP-MCNC: 3.1 G/DL (ref 3.5–5.2)
ALP SERPL-CCNC: 85 U/L (ref 55–135)
ALT SERPL W/O P-5'-P-CCNC: 18 U/L (ref 10–44)
ANION GAP SERPL CALC-SCNC: 7 MMOL/L (ref 8–16)
AST SERPL-CCNC: 18 U/L (ref 10–40)
BASOPHILS # BLD AUTO: 0.03 K/UL (ref 0–0.2)
BASOPHILS NFR BLD: 0.3 % (ref 0–1.9)
BILIRUB SERPL-MCNC: 0.2 MG/DL (ref 0.1–1)
BUN SERPL-MCNC: 5 MG/DL (ref 6–20)
CALCIUM SERPL-MCNC: 8.8 MG/DL (ref 8.7–10.5)
CHLORIDE SERPL-SCNC: 108 MMOL/L (ref 95–110)
CHOLEST SERPL-MCNC: 116 MG/DL (ref 120–199)
CHOLEST/HDLC SERPL: 2.9 {RATIO} (ref 2–5)
CO2 SERPL-SCNC: 25 MMOL/L (ref 23–29)
CREAT SERPL-MCNC: 0.6 MG/DL (ref 0.5–1.4)
DIFFERENTIAL METHOD: ABNORMAL
EOSINOPHIL # BLD AUTO: 0.8 K/UL (ref 0–0.5)
EOSINOPHIL NFR BLD: 7.8 % (ref 0–8)
ERYTHROCYTE [DISTWIDTH] IN BLOOD BY AUTOMATED COUNT: 13.2 % (ref 11.5–14.5)
EST. GFR  (AFRICAN AMERICAN): >60 ML/MIN/1.73 M^2
EST. GFR  (NON AFRICAN AMERICAN): >60 ML/MIN/1.73 M^2
ESTIMATED AVG GLUCOSE: 131 MG/DL (ref 68–131)
GLUCOSE SERPL-MCNC: 126 MG/DL (ref 70–110)
HBA1C MFR BLD HPLC: 6.2 % (ref 4–5.6)
HCT VFR BLD AUTO: 30.9 % (ref 37–48.5)
HDLC SERPL-MCNC: 40 MG/DL (ref 40–75)
HDLC SERPL: 34.5 % (ref 20–50)
HGB BLD-MCNC: 10 G/DL (ref 12–16)
IMM GRANULOCYTES # BLD AUTO: 0.07 K/UL (ref 0–0.04)
IMM GRANULOCYTES NFR BLD AUTO: 0.7 % (ref 0–0.5)
LDLC SERPL CALC-MCNC: 54.4 MG/DL (ref 63–159)
LYMPHOCYTES # BLD AUTO: 2.9 K/UL (ref 1–4.8)
LYMPHOCYTES NFR BLD: 26.8 % (ref 18–48)
MAGNESIUM SERPL-MCNC: 1.9 MG/DL (ref 1.6–2.6)
MCH RBC QN AUTO: 29.4 PG (ref 27–31)
MCHC RBC AUTO-ENTMCNC: 32.4 G/DL (ref 32–36)
MCV RBC AUTO: 91 FL (ref 82–98)
MONOCYTES # BLD AUTO: 1.6 K/UL (ref 0.3–1)
MONOCYTES NFR BLD: 14.8 % (ref 4–15)
NEUTROPHILS # BLD AUTO: 5.3 K/UL (ref 1.8–7.7)
NEUTROPHILS NFR BLD: 49.6 % (ref 38–73)
NONHDLC SERPL-MCNC: 76 MG/DL
NRBC BLD-RTO: 0 /100 WBC
PHOSPHATE SERPL-MCNC: 3.6 MG/DL (ref 2.7–4.5)
PLATELET # BLD AUTO: 212 K/UL (ref 150–350)
PMV BLD AUTO: 12.7 FL (ref 9.2–12.9)
POCT GLUCOSE: 119 MG/DL (ref 70–110)
POCT GLUCOSE: 121 MG/DL (ref 70–110)
POCT GLUCOSE: 98 MG/DL (ref 70–110)
POTASSIUM SERPL-SCNC: 4.1 MMOL/L (ref 3.5–5.1)
PROCALCITONIN SERPL IA-MCNC: 0.03 NG/ML
PROT SERPL-MCNC: 6.1 G/DL (ref 6–8.4)
RBC # BLD AUTO: 3.4 M/UL (ref 4–5.4)
SODIUM SERPL-SCNC: 140 MMOL/L (ref 136–145)
TRIGL SERPL-MCNC: 108 MG/DL (ref 30–150)
WBC # BLD AUTO: 10.63 K/UL (ref 3.9–12.7)

## 2019-10-29 PROCEDURE — 63600175 PHARM REV CODE 636 W HCPCS: Performed by: NURSE PRACTITIONER

## 2019-10-29 PROCEDURE — 25000003 PHARM REV CODE 250: Performed by: NURSE PRACTITIONER

## 2019-10-29 PROCEDURE — 94761 N-INVAS EAR/PLS OXIMETRY MLT: CPT

## 2019-10-29 PROCEDURE — 80061 LIPID PANEL: CPT

## 2019-10-29 PROCEDURE — 36415 COLL VENOUS BLD VENIPUNCTURE: CPT

## 2019-10-29 PROCEDURE — 84145 PROCALCITONIN (PCT): CPT

## 2019-10-29 PROCEDURE — 20600001 HC STEP DOWN PRIVATE ROOM

## 2019-10-29 PROCEDURE — 84100 ASSAY OF PHOSPHORUS: CPT

## 2019-10-29 PROCEDURE — 63600175 PHARM REV CODE 636 W HCPCS: Performed by: HOSPITALIST

## 2019-10-29 PROCEDURE — 83735 ASSAY OF MAGNESIUM: CPT

## 2019-10-29 PROCEDURE — 99233 SBSQ HOSP IP/OBS HIGH 50: CPT | Mod: ,,, | Performed by: HOSPITALIST

## 2019-10-29 PROCEDURE — 80053 COMPREHEN METABOLIC PANEL: CPT

## 2019-10-29 PROCEDURE — 83036 HEMOGLOBIN GLYCOSYLATED A1C: CPT

## 2019-10-29 PROCEDURE — 99233 PR SUBSEQUENT HOSPITAL CARE,LEVL III: ICD-10-PCS | Mod: ,,, | Performed by: HOSPITALIST

## 2019-10-29 PROCEDURE — 85025 COMPLETE CBC W/AUTO DIFF WBC: CPT

## 2019-10-29 RX ORDER — SODIUM CHLORIDE, SODIUM LACTATE, POTASSIUM CHLORIDE, CALCIUM CHLORIDE 600; 310; 30; 20 MG/100ML; MG/100ML; MG/100ML; MG/100ML
INJECTION, SOLUTION INTRAVENOUS CONTINUOUS
Status: DISCONTINUED | OUTPATIENT
Start: 2019-10-29 | End: 2019-10-31 | Stop reason: HOSPADM

## 2019-10-29 RX ORDER — HYDROXYZINE PAMOATE 25 MG/1
25 CAPSULE ORAL EVERY 8 HOURS PRN
Status: DISCONTINUED | OUTPATIENT
Start: 2019-10-29 | End: 2019-10-31 | Stop reason: HOSPADM

## 2019-10-29 RX ORDER — HYDRALAZINE HYDROCHLORIDE 20 MG/ML
15 INJECTION INTRAMUSCULAR; INTRAVENOUS EVERY 4 HOURS PRN
Status: DISCONTINUED | OUTPATIENT
Start: 2019-10-29 | End: 2019-10-31 | Stop reason: HOSPADM

## 2019-10-29 RX ADMIN — HYDROCODONE BITARTRATE AND ACETAMINOPHEN 1 TABLET: 5; 325 TABLET ORAL at 04:10

## 2019-10-29 RX ADMIN — SODIUM CHLORIDE: 0.9 INJECTION, SOLUTION INTRAVENOUS at 08:10

## 2019-10-29 RX ADMIN — AMLODIPINE BESYLATE 5 MG: 5 TABLET ORAL at 08:10

## 2019-10-29 RX ADMIN — ENOXAPARIN SODIUM 40 MG: 100 INJECTION SUBCUTANEOUS at 05:10

## 2019-10-29 RX ADMIN — SODIUM CHLORIDE, SODIUM LACTATE, POTASSIUM CHLORIDE, AND CALCIUM CHLORIDE: .6; .31; .03; .02 INJECTION, SOLUTION INTRAVENOUS at 08:10

## 2019-10-29 NOTE — SUBJECTIVE & OBJECTIVE
Past Medical History:   Diagnosis Date    Depression        History reviewed. No pertinent surgical history.    Review of patient's allergies indicates:  No Known Allergies    No current facility-administered medications on file prior to encounter.      Current Outpatient Medications on File Prior to Encounter   Medication Sig    AMLODIPINE BESYLATE (NORVASC ORAL) Take 5 mg by mouth.     bismuth subsalicylate (PEPTO BISMOL) 262 mg/15 mL suspension Take 15 mLs by mouth every 6 (six) hours as needed for Indigestion.    divalproex (DEPAKOTE) 500 MG TbEC Take 250 mg by mouth every 8 (eight) hours.    hydrOXYzine pamoate (VISTARIL) 50 MG Cap Take 1 capsule (50 mg total) by mouth 4 (four) times daily.    metFORMIN (GLUCOPHAGE) 500 MG tablet Take 500 mg by mouth 2 (two) times daily with meals.    QUEtiapine (SEROQUEL) 300 MG Tab Take 350 mg by mouth.    ranitidine (ZANTAC) 150 MG capsule Take 150 mg by mouth 2 (two) times daily.    simvastatin (ZOCOR) 40 MG tablet Take 40 mg by mouth every evening.    benztropine (COGENTIN) 0.5 MG tablet Take 0.5 mg by mouth 2 (two) times daily.    ibuprofen (ADVIL,MOTRIN) 600 MG tablet Take 1 tablet (600 mg total) by mouth every 6 (six) hours as needed for Pain.    PALIPERIDONE PALMITATE (INVEGA SUSTENNA IM) Inject into the muscle.    triamcinolone acetonide 0.1% (KENALOG) 0.1 % cream Apply topically 2 (two) times daily.     Family History     None        Tobacco Use    Smoking status: Current Every Day Smoker   Substance and Sexual Activity    Alcohol use: No    Drug use: No    Sexual activity: Not on file     Review of Systems   Constitutional: Positive for fever. Negative for activity change and appetite change.   HENT: Negative for congestion, ear pain, rhinorrhea and sinus pressure.    Eyes: Negative for pain and discharge.   Respiratory: Negative for cough, chest tightness, shortness of breath and wheezing.    Cardiovascular: Negative for chest pain and leg  swelling.   Gastrointestinal: Negative for abdominal distention, abdominal pain, diarrhea, nausea and vomiting.   Endocrine: Negative for cold intolerance and heat intolerance.   Genitourinary: Negative for difficulty urinating, flank pain, frequency, hematuria and urgency.   Musculoskeletal: Positive for myalgias. Negative for arthralgias and joint swelling.   Skin: Positive for color change and rash (facial and arms).   Allergic/Immunologic: Negative for environmental allergies and food allergies.   Neurological: Negative for dizziness, weakness, light-headedness and headaches.   Hematological: Does not bruise/bleed easily.   Psychiatric/Behavioral: Negative for agitation, behavioral problems and decreased concentration.     Objective:     Vital Signs (Most Recent):  Temp: 98 °F (36.7 °C) (10/28/19 1937)  Pulse: 94 (10/28/19 2200)  Resp: 18 (10/28/19 1937)  BP: (!) 150/62 (10/28/19 1937)  SpO2: 99 % (10/28/19 1937) Vital Signs (24h Range):  Temp:  [97.8 °F (36.6 °C)-98 °F (36.7 °C)] 98 °F (36.7 °C)  Pulse:  [] 94  Resp:  [18-20] 18  SpO2:  [96 %-100 %] 99 %  BP: (126-174)/(58-84) 150/62     Weight: 88.8 kg (195 lb 12.3 oz)  Body mass index is 35.81 kg/m².    Physical Exam   Constitutional: She appears well-developed and well-nourished.   HENT:   Head: Normocephalic.   Eyes: Conjunctivae are normal. Right eye exhibits no discharge. Left eye exhibits no discharge.   Neck: Normal range of motion. Neck supple.   Cardiovascular: Regular rhythm, normal heart sounds and intact distal pulses. Tachycardia present.   Pulses:       Radial pulses are 1+ on the right side, and 1+ on the left side.   Pulmonary/Chest: Effort normal and breath sounds normal. No respiratory distress.   Abdominal: Soft. Bowel sounds are normal. She exhibits no distension. There is generalized tenderness.   Musculoskeletal: Normal range of motion.   Neurological: She is alert. GCS eye subscore is 4. GCS verbal subscore is 5. GCS motor subscore  is 6.   Skin: Skin is warm and dry.   Psychiatric: She has a normal mood and affect. Her speech is normal and behavior is normal.           Significant Labs:   CBC:   Recent Labs   Lab 10/28/19  0922   WBC 15.29*   HGB 12.0   HCT 36.7*        CMP:   Recent Labs   Lab 10/28/19  0922      K 4.0      CO2 25   GLU 95   BUN 6   CREATININE 0.7   CALCIUM 9.5   PROT 7.2   ALBUMIN 3.7   BILITOT 0.3   ALKPHOS 85   AST 15   ALT 19   ANIONGAP 11   EGFRNONAA >60       Significant Imaging: I have reviewed all pertinent imaging results/findings within the past 24 hours.

## 2019-10-29 NOTE — CONSULTS
Consulted for redness to arms, face and hands.  Patient reports this redness started after using a cleansing product.  The arms are reddened and sandpaper like in appearance.  The patient has gloves on hands.  When gloves removed the hands are moist though the patient does not report that she used creams or lotions on hands.  There is one area on the right side of face that has faint redness.  There does not appear to be open tissue areas.  If skin begins to slough,  I would recommend a silver moisture transference type of dressing which would protect tissue without being traumatic to tissue.  Etiology unclear.  Await transfer to [facility] so that Dermatology can evaluate.  Discussed with Dr. Helm.

## 2019-10-29 NOTE — PLAN OF CARE
LMSW met with the patient at the bedside.     Patient is alert and oriented with no communication barriers. Patients 1st language is Czech.     Prior to admission patient was independent and lives in [facility] group home. Patient denies the use of HH or DME.     Patients PCP is correct on the face sheet. Patient choice pharmacy is unknown by the patient.       Patient denies having any advance directives.     Patient will be transported home by group home staff at discharge.      No CM needs identified at this time.     CM Team will continue to follow.        10/29/19 1203   Discharge Assessment   Assessment Type Discharge Planning Assessment   Confirmed/corrected address and phone number on facesheet? Yes   Assessment information obtained from? Patient   Communicated expected length of stay with patient/caregiver no   Prior to hospitilization cognitive status: Alert/Oriented   Prior to hospitalization functional status: Independent   Current cognitive status: Alert/Oriented   Current Functional Status: Independent   Lives With other (see comments)   Able to Return to Prior Arrangements yes   Is patient able to care for self after discharge? Yes   Patient's perception of discharge disposition group home   Readmission Within the Last 30 Days no previous admission in last 30 days   Patient currently being followed by outpatient case management? No   Patient currently receives any other outside agency services? No   Equipment Currently Used at Home none   Do you have any problems affording any of your prescribed medications? No   Is the patient taking medications as prescribed? yes   Does the patient have transportation home? Yes   Transportation Anticipated family or friend will provide   Does the patient receive services at the [facility]? No   Discharge Plan A Group Home   DME Needed Upon Discharge  none   Patient/Family in Agreement with Plan yes

## 2019-10-29 NOTE — ASSESSMENT & PLAN NOTE
Patient is having a perceived systemic reaction to medications. Facial, bilateral arm swelling/redness to the hands noted.  Will hold all meds except for amlodipine overnight. Awaiting bed overnight at [facility] for stat dermatology consult.  Will give IVF and consult wound care

## 2019-10-29 NOTE — PROGRESS NOTES
[facility] Hospital Medicine  Progress Note    Patient Name: Jazz Hernandez  MRN: [id number]  Patient Class: IP- Inpatient   Admission Date: 10/28/2019  Length of Stay: 1 days  Attending Physician: Joe Helm MD  Primary Care Provider: [facility]        Subjective:     Principal Problem:Rash and nonspecific skin eruption        HPI:  The patient is a 59 y.o. female who presents with facial swelling and redness that began three days ago and is not alleviating. Patient also has redness and swelling over bilateral arms.  Her caregiver reports when she last saw the patient three days ago she was normal. The patient also reports lip swelling. The patient reports she has had this happen in the past. The patient is a resident at [facility]. The patient has a list of medications that she takes that includes Depakote, Invega, metformin, vitamin D, Pepto bismol, Ibuprofen, Zantac, Norvasc, Cogentin, Seroquel, and Zocor.    Overview/Hospital Course:  No notes on file    Interval History: No acute events overnight.  She remains very anxious about the rash on the dorsum of her arms and hands.  She notes that this started on Saturday after she was cleaning her room with Fabulosa cleaning agent.  She stated she had never used this before.      Review of Systems   Constitutional: Negative for activity change and appetite change.   HENT: Positive for facial swelling. Negative for congestion and drooling.    Eyes: Negative for discharge and itching.   Respiratory: Negative for apnea, cough, chest tightness and shortness of breath.    Cardiovascular: Negative for chest pain and leg swelling.   Gastrointestinal: Negative for abdominal distention and abdominal pain.   Endocrine: Negative for cold intolerance and heat intolerance.   Genitourinary: Negative for difficulty urinating and dysuria.   Musculoskeletal: Positive for arthralgias.   Skin: Positive for rash. Negative for  color change and pallor.   Neurological: Negative for dizziness and facial asymmetry.   Psychiatric/Behavioral: The patient is nervous/anxious.      Objective:     Vital Signs (Most Recent):  Temp: 98.9 °F (37.2 °C) (10/29/19 1206)  Pulse: 104 (10/29/19 1206)  Resp: 18 (10/29/19 1206)  BP: 136/69 (10/29/19 1206)  SpO2: 99 % (10/29/19 1206) Vital Signs (24h Range):  Temp:  [97.9 °F (36.6 °C)-98.9 °F (37.2 °C)] 98.9 °F (37.2 °C)  Pulse:  [] 104  Resp:  [16-20] 18  SpO2:  [92 %-100 %] 99 %  BP: (114-190)/(56-97) 136/69     Weight: 88.8 kg (195 lb 12.3 oz)  Body mass index is 35.81 kg/m².    Intake/Output Summary (Last 24 hours) at 10/29/2019 1428  Last data filed at 10/29/2019 1000  Gross per 24 hour   Intake 240 ml   Output --   Net 240 ml      Physical Exam   Constitutional: She is oriented to person, place, and time. She appears well-developed and well-nourished.   HENT:   Head: Normocephalic and atraumatic.   Eyes: Pupils are equal, round, and reactive to light. EOM are normal.   Neck: Normal range of motion. Neck supple.   Cardiovascular: Normal rate, regular rhythm and normal heart sounds.   Pulmonary/Chest: Effort normal and breath sounds normal. No respiratory distress.   Abdominal: Soft. Bowel sounds are normal. She exhibits no distension. There is no tenderness.   Musculoskeletal: Normal range of motion. She exhibits no edema.   Neurological: She is oriented to person, place, and time. No cranial nerve deficit. Coordination normal.   Skin: Skin is warm and dry.   Peeling dry rash on dorsum of forearms with significant erythema and edema.  Hands are in surgical gloves and are very slimmy.  Hands are swollen and red but no obvious wounds.  Skin looks thick and irritated.  Due to language barrier I cannot tell if she has coated her hands with vaseline or if that is biological fluid seeping through her skin.  No bleeding or purulent discharge noted.   Psychiatric: She has a normal mood and affect. Her  "behavior is normal.   Vitals reviewed.      Significant Labs: All pertinent labs within the past 24 hours have been reviewed.    Significant Imaging: I have reviewed and interpreted all pertinent imaging results/findings within the past 24 hours.      Assessment/Plan:      * Rash and nonspecific skin eruption  -Mrs. Hernandez is admitted to inpatient status.  -She has developed a severe rash on her face, arms and hands that developed after cleaning her room with "fabulosa" cleaning agent  -The rash is severe and it is unclear to me the exact pathophysiology:  chemical burn, medication allergy, autoimmune process, TEN, SJS?  -She had a mild leukocytosis on admit with a bit of eosinophilia which has resolved without specific treatment  -Lactic acid, ESR and CPK are normal.  CRP modestly elevated on admit.  -I will check procalcitonin, but hold off on antibiotics or steroids for now pending dermatology evaluation.  -Await wound care consultation  -Awaiting bed availability at [facility] for transfer for dermatology evaluation.  Needs biopsy.      Hyperlipidemia  -Holding statin for now       Essential hypertension  -Continue home norvasc      Leukocytosis  -Mild on admit and has resolved this morning  -Check procalcitonin  -No obvious infection at this time.        VTE Risk Mitigation (From admission, onward)         Ordered     enoxaparin injection 40 mg  Daily      10/28/19 2022     IP VTE HIGH RISK PATIENT  Once      10/28/19 2022     Place sequential compression device  Until discontinued      10/28/19 1913                      Joe Helm MD  Department of Hospital Medicine   [facility]  "

## 2019-10-29 NOTE — SUBJECTIVE & OBJECTIVE
Interval History: No acute events overnight.  She remains very anxious about the rash on the dorsum of her arms and hands.  She notes that this started on Saturday after she was cleaning her room with Fabulosa cleaning agent.  She stated she had never used this before.      Review of Systems   Constitutional: Negative for activity change and appetite change.   HENT: Positive for facial swelling. Negative for congestion and drooling.    Eyes: Negative for discharge and itching.   Respiratory: Negative for apnea, cough, chest tightness and shortness of breath.    Cardiovascular: Negative for chest pain and leg swelling.   Gastrointestinal: Negative for abdominal distention and abdominal pain.   Endocrine: Negative for cold intolerance and heat intolerance.   Genitourinary: Negative for difficulty urinating and dysuria.   Musculoskeletal: Positive for arthralgias.   Skin: Positive for rash. Negative for color change and pallor.   Neurological: Negative for dizziness and facial asymmetry.   Psychiatric/Behavioral: The patient is nervous/anxious.      Objective:     Vital Signs (Most Recent):  Temp: 98.9 °F (37.2 °C) (10/29/19 1206)  Pulse: 104 (10/29/19 1206)  Resp: 18 (10/29/19 1206)  BP: 136/69 (10/29/19 1206)  SpO2: 99 % (10/29/19 1206) Vital Signs (24h Range):  Temp:  [97.9 °F (36.6 °C)-98.9 °F (37.2 °C)] 98.9 °F (37.2 °C)  Pulse:  [] 104  Resp:  [16-20] 18  SpO2:  [92 %-100 %] 99 %  BP: (114-190)/(56-97) 136/69     Weight: 88.8 kg (195 lb 12.3 oz)  Body mass index is 35.81 kg/m².    Intake/Output Summary (Last 24 hours) at 10/29/2019 1428  Last data filed at 10/29/2019 1000  Gross per 24 hour   Intake 240 ml   Output --   Net 240 ml      Physical Exam   Constitutional: She is oriented to person, place, and time. She appears well-developed and well-nourished.   HENT:   Head: Normocephalic and atraumatic.   Eyes: Pupils are equal, round, and reactive to light. EOM are normal.   Neck: Normal range of motion.  Neck supple.   Cardiovascular: Normal rate, regular rhythm and normal heart sounds.   Pulmonary/Chest: Effort normal and breath sounds normal. No respiratory distress.   Abdominal: Soft. Bowel sounds are normal. She exhibits no distension. There is no tenderness.   Musculoskeletal: Normal range of motion. She exhibits no edema.   Neurological: She is oriented to person, place, and time. No cranial nerve deficit. Coordination normal.   Skin: Skin is warm and dry.   Peeling dry rash on dorsum of forearms with significant erythema and edema.  Hands are in surgical gloves and are very slimmy.  Hands are swollen and red but no obvious wounds.  Skin looks thick and irritated.  Due to language barrier I cannot tell if she has coated her hands with vaseline or if that is biological fluid seeping through her skin.  No bleeding or purulent discharge noted.   Psychiatric: She has a normal mood and affect. Her behavior is normal.   Vitals reviewed.      Significant Labs: All pertinent labs within the past 24 hours have been reviewed.    Significant Imaging: I have reviewed and interpreted all pertinent imaging results/findings within the past 24 hours.

## 2019-10-29 NOTE — PLAN OF CARE
"Hospital Medicine  Transfer Plan of Care Note      Patient Name: Jazz Hernandez  MRN:  [id number]  Hospital Medicine Team: [facility] HOSP MED K Jahaira Quintanilla MD  Date of Admission:  10/28/2019     Principal Problem:  Rash and nonspecific skin eruption   Primary Care Physician: [facility]       Ms. Jazz Hernandez is a 59 y.o. female who presents as a transfer from [facility] for Dermatology evaluation of a skin rash.  She was cleaning using "Fabuloso" , and endured an erythematous and pruritic rash to both upper extremities after.              Plan:   Derm consult   Atarax prn itching      Jahaira Quintanilla MD  [facility] Medicine  Cell:  [phone]  Spectra:  [phone]  Pager:  [phone]    "

## 2019-10-29 NOTE — ASSESSMENT & PLAN NOTE
"-Mrs. Hernandez is admitted to inpatient status.  -She has developed a severe rash on her face, arms and hands that developed after cleaning her room with "fabulosa" cleaning agent  -The rash is severe and it is unclear to me the exact pathophysiology:  chemical burn, medication allergy, autoimmune process, TEN, SJS?  -She had a mild leukocytosis on admit with a bit of eosinophilia which has resolved without specific treatment  -Lactic acid, ESR and CPK are normal.  CRP modestly elevated on admit.  -I will check procalcitonin, but hold off on antibiotics or steroids for now pending dermatology evaluation.  -Await wound care consultation  -Awaiting bed availability at [facility] for transfer for dermatology evaluation.  Needs biopsy.    "

## 2019-10-29 NOTE — ASSESSMENT & PLAN NOTE
-Mild on admit and has resolved this morning  -Check procalcitonin  -No obvious infection at this time.

## 2019-10-29 NOTE — HPI
The patient is a 59 y.o. female who presents with facial swelling and redness that began three days ago and is not alleviating. Patient also has redness and swelling over bilateral arms.  Her caregiver reports when she last saw the patient three days ago she was normal. The patient also reports lip swelling. The patient reports she has had this happen in the past. The patient is a resident at [facility]. The patient has a list of medications that she takes that includes Depakote, Invega, metformin, vitamin D, Pepto bismol, Ibuprofen, Zantac, Norvasc, Cogentin, Seroquel, and Zocor.

## 2019-10-29 NOTE — PLAN OF CARE
Pt in bed, pt experiencing some anxiety over wounds, nurse and pt applied barrier cream to arms, POC reviewed with pt and verbalized understanding, getting  for pt to communicate needs with more appropriately, bed in low locked position, call light in reach, hourly rounds complete, will continue to assess

## 2019-10-29 NOTE — PROGRESS NOTES
Report called to SEDRICK Peraza at [facility] ([phone]).  Pt will be going to the Medical Telemetry Stepdown Unit (MTSU) in the 8th floor [facility].

## 2019-10-29 NOTE — H&P
[facility] Hospital Medicine  History & Physical    Patient Name: Jazz Hernandez  MRN: [id number]  Admission Date: 10/28/2019  Attending Physician: Joey Allen MD   Primary Care Provider: [facility]         Patient information was obtained from patient and ER records.     Subjective:     Principal Problem:Drug reaction    Chief Complaint:   Chief Complaint   Patient presents with    Facial Swelling     Facial swelling x 3 days; denies SOB.         HPI: The patient is a 59 y.o. female who presents with facial swelling and redness that began three days ago and is not alleviating. Patient also has redness and swelling over bilateral arms.  Her caregiver reports when she last saw the patient three days ago she was normal. The patient also reports lip swelling. The patient reports she has had this happen in the past. The patient is a resident at [facility]. The patient has a list of medications that she takes that includes Depakote, Invega, metformin, vitamin D, Pepto bismol, Ibuprofen, Zantac, Norvasc, Cogentin, Seroquel, and Zocor.    Past Medical History:   Diagnosis Date    Depression        History reviewed. No pertinent surgical history.    Review of patient's allergies indicates:  No Known Allergies    No current facility-administered medications on file prior to encounter.      Current Outpatient Medications on File Prior to Encounter   Medication Sig    AMLODIPINE BESYLATE (NORVASC ORAL) Take 5 mg by mouth.     bismuth subsalicylate (PEPTO BISMOL) 262 mg/15 mL suspension Take 15 mLs by mouth every 6 (six) hours as needed for Indigestion.    divalproex (DEPAKOTE) 500 MG TbEC Take 250 mg by mouth every 8 (eight) hours.    hydrOXYzine pamoate (VISTARIL) 50 MG Cap Take 1 capsule (50 mg total) by mouth 4 (four) times daily.    metFORMIN (GLUCOPHAGE) 500 MG tablet Take 500 mg by mouth 2 (two) times daily with meals.    QUEtiapine (SEROQUEL) 300 MG  Tab Take 350 mg by mouth.    ranitidine (ZANTAC) 150 MG capsule Take 150 mg by mouth 2 (two) times daily.    simvastatin (ZOCOR) 40 MG tablet Take 40 mg by mouth every evening.    benztropine (COGENTIN) 0.5 MG tablet Take 0.5 mg by mouth 2 (two) times daily.    ibuprofen (ADVIL,MOTRIN) 600 MG tablet Take 1 tablet (600 mg total) by mouth every 6 (six) hours as needed for Pain.    PALIPERIDONE PALMITATE (INVEGA SUSTENNA IM) Inject into the muscle.    triamcinolone acetonide 0.1% (KENALOG) 0.1 % cream Apply topically 2 (two) times daily.     Family History     None        Tobacco Use    Smoking status: Current Every Day Smoker   Substance and Sexual Activity    Alcohol use: No    Drug use: No    Sexual activity: Not on file     Review of Systems   Constitutional: Positive for fever. Negative for activity change and appetite change.   HENT: Negative for congestion, ear pain, rhinorrhea and sinus pressure.    Eyes: Negative for pain and discharge.   Respiratory: Negative for cough, chest tightness, shortness of breath and wheezing.    Cardiovascular: Negative for chest pain and leg swelling.   Gastrointestinal: Negative for abdominal distention, abdominal pain, diarrhea, nausea and vomiting.   Endocrine: Negative for cold intolerance and heat intolerance.   Genitourinary: Negative for difficulty urinating, flank pain, frequency, hematuria and urgency.   Musculoskeletal: Positive for myalgias. Negative for arthralgias and joint swelling.   Skin: Positive for color change and rash (facial and arms).   Allergic/Immunologic: Negative for environmental allergies and food allergies.   Neurological: Negative for dizziness, weakness, light-headedness and headaches.   Hematological: Does not bruise/bleed easily.   Psychiatric/Behavioral: Negative for agitation, behavioral problems and decreased concentration.     Objective:     Vital Signs (Most Recent):  Temp: 98 °F (36.7 °C) (10/28/19 1937)  Pulse: 94 (10/28/19  2200)  Resp: 18 (10/28/19 1937)  BP: (!) 150/62 (10/28/19 1937)  SpO2: 99 % (10/28/19 1937) Vital Signs (24h Range):  Temp:  [97.8 °F (36.6 °C)-98 °F (36.7 °C)] 98 °F (36.7 °C)  Pulse:  [] 94  Resp:  [18-20] 18  SpO2:  [96 %-100 %] 99 %  BP: (126-174)/(58-84) 150/62     Weight: 88.8 kg (195 lb 12.3 oz)  Body mass index is 35.81 kg/m².    Physical Exam   Constitutional: She appears well-developed and well-nourished.   HENT:   Head: Normocephalic.   Eyes: Conjunctivae are normal. Right eye exhibits no discharge. Left eye exhibits no discharge.   Neck: Normal range of motion. Neck supple.   Cardiovascular: Regular rhythm, normal heart sounds and intact distal pulses. Tachycardia present.   Pulses:       Radial pulses are 1+ on the right side, and 1+ on the left side.   Pulmonary/Chest: Effort normal and breath sounds normal. No respiratory distress.   Abdominal: Soft. Bowel sounds are normal. She exhibits no distension. There is generalized tenderness.   Musculoskeletal: Normal range of motion.   Neurological: She is alert. GCS eye subscore is 4. GCS verbal subscore is 5. GCS motor subscore is 6.   Skin: Skin is warm and dry.   Psychiatric: She has a normal mood and affect. Her speech is normal and behavior is normal.           Significant Labs:   CBC:   Recent Labs   Lab 10/28/19  0922   WBC 15.29*   HGB 12.0   HCT 36.7*        CMP:   Recent Labs   Lab 10/28/19  0922      K 4.0      CO2 25   GLU 95   BUN 6   CREATININE 0.7   CALCIUM 9.5   PROT 7.2   ALBUMIN 3.7   BILITOT 0.3   ALKPHOS 85   AST 15   ALT 19   ANIONGAP 11   EGFRNONAA >60       Significant Imaging: I have reviewed all pertinent imaging results/findings within the past 24 hours.    Assessment/Plan:     * Drug reaction  Patient is having a perceived systemic reaction to medications. Facial, bilateral arm swelling/redness to the hands noted.  Will hold all meds except for amlodipine overnight. Awaiting bed overnight at [facility] for  stat dermatology consult.  Will give IVF and consult wound care        VTE Risk Mitigation (From admission, onward)         Ordered     enoxaparin injection 40 mg  Daily      10/28/19 2022     IP VTE HIGH RISK PATIENT  Once      10/28/19 2022     Place sequential compression device  Until discontinued      10/28/19 1913                   Bro Blank NP  Department of Hospital Medicine   [facility]

## 2019-10-29 NOTE — PROGRESS NOTES
Pt put on her own clothes against advice from RN and PCT.  Did so without assistance.  RN concerned that integrity of IV may be compromised.  RN called SEDRICK Peraza, back to update her.

## 2019-10-30 PROBLEM — L24.9 IRRITANT CONTACT DERMATITIS, UNSPECIFIED CAUSE: Status: ACTIVE | Noted: 2019-10-28

## 2019-10-30 PROBLEM — F20.9 SCHIZOPHRENIA: Status: ACTIVE | Noted: 2019-10-30

## 2019-10-30 LAB
ALBUMIN SERPL BCP-MCNC: 3.5 G/DL (ref 3.5–5.2)
ALP SERPL-CCNC: 121 U/L (ref 55–135)
ALT SERPL W/O P-5'-P-CCNC: 29 U/L (ref 10–44)
ANION GAP SERPL CALC-SCNC: 10 MMOL/L (ref 8–16)
AST SERPL-CCNC: 29 U/L (ref 10–40)
BASOPHILS # BLD AUTO: 0.08 K/UL (ref 0–0.2)
BASOPHILS NFR BLD: 0.7 % (ref 0–1.9)
BILIRUB SERPL-MCNC: 0.2 MG/DL (ref 0.1–1)
BUN SERPL-MCNC: 9 MG/DL (ref 6–20)
CALCIUM SERPL-MCNC: 9.7 MG/DL (ref 8.7–10.5)
CHLORIDE SERPL-SCNC: 105 MMOL/L (ref 95–110)
CO2 SERPL-SCNC: 25 MMOL/L (ref 23–29)
CREAT SERPL-MCNC: 0.8 MG/DL (ref 0.5–1.4)
DIFFERENTIAL METHOD: ABNORMAL
EOSINOPHIL # BLD AUTO: 0.7 K/UL (ref 0–0.5)
EOSINOPHIL NFR BLD: 6.1 % (ref 0–8)
ERYTHROCYTE [DISTWIDTH] IN BLOOD BY AUTOMATED COUNT: 13.4 % (ref 11.5–14.5)
EST. GFR  (AFRICAN AMERICAN): >60 ML/MIN/1.73 M^2
EST. GFR  (NON AFRICAN AMERICAN): >60 ML/MIN/1.73 M^2
GLUCOSE SERPL-MCNC: 118 MG/DL (ref 70–110)
HCT VFR BLD AUTO: 35.3 % (ref 37–48.5)
HGB BLD-MCNC: 11 G/DL (ref 12–16)
IMM GRANULOCYTES # BLD AUTO: 0.12 K/UL (ref 0–0.04)
IMM GRANULOCYTES NFR BLD AUTO: 1 % (ref 0–0.5)
LYMPHOCYTES # BLD AUTO: 3.5 K/UL (ref 1–4.8)
LYMPHOCYTES NFR BLD: 30.2 % (ref 18–48)
MAGNESIUM SERPL-MCNC: 1.9 MG/DL (ref 1.6–2.6)
MCH RBC QN AUTO: 29.1 PG (ref 27–31)
MCHC RBC AUTO-ENTMCNC: 31.2 G/DL (ref 32–36)
MCV RBC AUTO: 93 FL (ref 82–98)
MONOCYTES # BLD AUTO: 1.7 K/UL (ref 0.3–1)
MONOCYTES NFR BLD: 14.9 % (ref 4–15)
NEUTROPHILS # BLD AUTO: 5.5 K/UL (ref 1.8–7.7)
NEUTROPHILS NFR BLD: 47.1 % (ref 38–73)
NRBC BLD-RTO: 0 /100 WBC
PLATELET # BLD AUTO: 253 K/UL (ref 150–350)
PMV BLD AUTO: 13 FL (ref 9.2–12.9)
POCT GLUCOSE: 113 MG/DL (ref 70–110)
POCT GLUCOSE: 96 MG/DL (ref 70–110)
POTASSIUM SERPL-SCNC: 4.4 MMOL/L (ref 3.5–5.1)
PROT SERPL-MCNC: 7.1 G/DL (ref 6–8.4)
RBC # BLD AUTO: 3.78 M/UL (ref 4–5.4)
SODIUM SERPL-SCNC: 140 MMOL/L (ref 136–145)
WBC # BLD AUTO: 11.57 K/UL (ref 3.9–12.7)

## 2019-10-30 PROCEDURE — 99232 SBSQ HOSP IP/OBS MODERATE 35: CPT | Mod: ,,, | Performed by: HOSPITALIST

## 2019-10-30 PROCEDURE — 20600001 HC STEP DOWN PRIVATE ROOM

## 2019-10-30 PROCEDURE — 63600175 PHARM REV CODE 636 W HCPCS: Performed by: NURSE PRACTITIONER

## 2019-10-30 PROCEDURE — 36415 COLL VENOUS BLD VENIPUNCTURE: CPT

## 2019-10-30 PROCEDURE — 25000003 PHARM REV CODE 250: Performed by: NURSE PRACTITIONER

## 2019-10-30 PROCEDURE — 25000003 PHARM REV CODE 250: Performed by: HOSPITALIST

## 2019-10-30 PROCEDURE — 83735 ASSAY OF MAGNESIUM: CPT

## 2019-10-30 PROCEDURE — 85025 COMPLETE CBC W/AUTO DIFF WBC: CPT

## 2019-10-30 PROCEDURE — 99232 PR SUBSEQUENT HOSPITAL CARE,LEVL II: ICD-10-PCS | Mod: ,,, | Performed by: HOSPITALIST

## 2019-10-30 PROCEDURE — 80053 COMPREHEN METABOLIC PANEL: CPT

## 2019-10-30 PROCEDURE — 63600175 PHARM REV CODE 636 W HCPCS: Performed by: HOSPITALIST

## 2019-10-30 RX ORDER — DIVALPROEX SODIUM 250 MG/1
250 TABLET, DELAYED RELEASE ORAL EVERY 8 HOURS
Status: DISCONTINUED | OUTPATIENT
Start: 2019-10-30 | End: 2019-10-31 | Stop reason: HOSPADM

## 2019-10-30 RX ORDER — CLOBETASOL PROPIONATE 0.5 MG/G
OINTMENT TOPICAL 2 TIMES DAILY
Status: DISCONTINUED | OUTPATIENT
Start: 2019-10-30 | End: 2019-10-31 | Stop reason: HOSPADM

## 2019-10-30 RX ORDER — QUETIAPINE FUMARATE 300 MG/1
300 TABLET, FILM COATED ORAL NIGHTLY
Status: DISCONTINUED | OUTPATIENT
Start: 2019-10-30 | End: 2019-10-31 | Stop reason: HOSPADM

## 2019-10-30 RX ORDER — BENZTROPINE MESYLATE 0.5 MG/1
0.5 TABLET ORAL 2 TIMES DAILY
Status: DISCONTINUED | OUTPATIENT
Start: 2019-10-30 | End: 2019-10-31 | Stop reason: HOSPADM

## 2019-10-30 RX ORDER — HYDROCORTISONE 25 MG/G
CREAM TOPICAL 2 TIMES DAILY
Status: DISCONTINUED | OUTPATIENT
Start: 2019-10-30 | End: 2019-10-31 | Stop reason: HOSPADM

## 2019-10-30 RX ADMIN — SODIUM CHLORIDE, SODIUM LACTATE, POTASSIUM CHLORIDE, AND CALCIUM CHLORIDE: .6; .31; .03; .02 INJECTION, SOLUTION INTRAVENOUS at 09:10

## 2019-10-30 RX ADMIN — BENZTROPINE MESYLATE 0.5 MG: 0.5 TABLET ORAL at 11:10

## 2019-10-30 RX ADMIN — DIVALPROEX SODIUM 250 MG: 250 TABLET, DELAYED RELEASE ORAL at 09:10

## 2019-10-30 RX ADMIN — HYDROCORTISONE: 25 CREAM TOPICAL at 04:10

## 2019-10-30 RX ADMIN — HYDROCODONE BITARTRATE AND ACETAMINOPHEN 1 TABLET: 5; 325 TABLET ORAL at 04:10

## 2019-10-30 RX ADMIN — HYDROXYZINE PAMOATE 25 MG: 25 CAPSULE ORAL at 02:10

## 2019-10-30 RX ADMIN — DIVALPROEX SODIUM 250 MG: 250 TABLET, DELAYED RELEASE ORAL at 11:10

## 2019-10-30 RX ADMIN — HYDROCORTISONE: 25 CREAM TOPICAL at 09:10

## 2019-10-30 RX ADMIN — CLOBETASOL PROPIONATE: 0.5 OINTMENT TOPICAL at 09:10

## 2019-10-30 RX ADMIN — CLOBETASOL PROPIONATE: 0.5 OINTMENT TOPICAL at 04:10

## 2019-10-30 RX ADMIN — ENOXAPARIN SODIUM 40 MG: 100 INJECTION SUBCUTANEOUS at 04:10

## 2019-10-30 RX ADMIN — BENZTROPINE MESYLATE 0.5 MG: 0.5 TABLET ORAL at 09:10

## 2019-10-30 RX ADMIN — QUETIAPINE FUMARATE 300 MG: 300 TABLET ORAL at 09:10

## 2019-10-30 RX ADMIN — AMLODIPINE BESYLATE 5 MG: 5 TABLET ORAL at 09:10

## 2019-10-30 NOTE — SUBJECTIVE & OBJECTIVE
Past Medical History:   Diagnosis Date    Depression        History reviewed. No pertinent surgical history.  Family History     None        Tobacco Use    Smoking status: Current Every Day Smoker   Substance and Sexual Activity    Alcohol use: No    Drug use: No    Sexual activity: Not on file       Review of patient's allergies indicates:  No Known Allergies    Medications:  Continuous Infusions:   lactated ringers 100 mL/hr at 10/29/19 2052     Scheduled Meds:   amLODIPine  5 mg Oral Daily    enoxaparin  40 mg Subcutaneous Daily     PRN Meds:acetaminophen, dextrose 50%, dextrose 50%, glucagon (human recombinant), glucose, glucose, hydrALAZINE, HYDROcodone-acetaminophen, HYDROmorphone, hydrOXYzine pamoate, insulin aspart U-100, sodium chloride 0.9%, sodium chloride 0.9%    Review of Systems   Constitutional: Negative for fever, chills, fatigue and malaise.   Eyes: Negative for itching, eye irritation and visual change.   Gastrointestinal: Negative for nausea, vomiting, abdominal pain and diarrhea.   Musculoskeletal: Negative for myalgias, joint swelling and arthralgias.   Skin: Positive for itching, rash and dry skin.     Objective:     Vital Signs (Most Recent):  Temp: 99 °F (37.2 °C) (10/30/19 0738)  Pulse: 107 (10/30/19 0738)  Resp: 18 (10/30/19 0738)  BP: (!) 185/86 (10/30/19 0738)  SpO2: 98 % (10/30/19 0738) Vital Signs (24h Range):  Temp:  [98.5 °F (36.9 °C)-99 °F (37.2 °C)] 99 °F (37.2 °C)  Pulse:  [] 107  Resp:  [17-20] 18  SpO2:  [95 %-99 %] 98 %  BP: (114-185)/(56-86) 185/86     Weight: 88.8 kg (195 lb 12.3 oz)  Body mass index is 35.81 kg/m².    Physical Exam   Constitutional: She appears well-developed and well-nourished.   Neurological: She is alert and oriented to person, place, and time.   Psychiatric: Her mood appears anxious.   Skin:   Areas Examined (abnormalities noted in diagram):   Scalp / Hair Palpated and Inspected  Head / Face Inspection Performed  Neck Inspection  Performed  Chest / Axilla Inspection Performed  Abdomen Inspection Performed  Back Inspection Performed  RUE Inspected  LUE Inspection Performed  RLE Inspected  LLE Inspection Performed           Significant Labs: None    Significant Imaging: None

## 2019-10-30 NOTE — PLAN OF CARE
CM completed d/c assessment w/pt at bedside. Pt demographics and contact info updated. Prior to admit pt was independent at group home w/no DME or outside agencies. Pt to be transported back to group home staff at d/c. CM provided pt w/MONA which will be updated by nursing staff. Pt verbalized understanding.       10/30/19 6926   Discharge Assessment   Assessment Type Discharge Planning Assessment   Confirmed/corrected address and phone number on facesheet? Yes   Assessment information obtained from? Patient   Expected Length of Stay (days) 4   Communicated expected length of stay with patient/caregiver yes   Prior to hospitilization cognitive status: Alert/Oriented   Prior to hospitalization functional status: Independent   Current cognitive status: Alert/Oriented   Current Functional Status: Independent   Lives With other (see comments)  (group home)   Able to Return to Prior Arrangements yes   Is patient able to care for self after discharge? Yes   Who are your caregiver(s) and their phone number(s)? Fay Hardwick, friend, [phone]   Patient's perception of discharge disposition group home   Readmission Within the Last 30 Days no previous admission in last 30 days   Patient currently being followed by outpatient case management? No   Patient currently receives any other outside agency services? No   Equipment Currently Used at Home none   Do you have any problems affording any of your prescribed medications? No   Is the patient taking medications as prescribed? yes   Does the patient have transportation home? Yes   Transportation Anticipated other (see comments)  (Group home staff, Fay Hardwick [phone])   Does the patient receive services at the [facility]? No   Discharge Plan A Group Home   DME Needed Upon Discharge  none   Patient/Family in Agreement with Plan yes

## 2019-10-30 NOTE — PROGRESS NOTES
[facility] Hospital Medicine  Progress Note    Patient Name: Jazz Hernandez  MRN: [id number]  Patient Class: IP- Inpatient   Admission Date: 10/28/2019  Length of Stay: 2 days  Attending Physician: Taylor Cui MD  Primary Care Provider: [facility] Hospital Medicine Team: [facility] HOSP MED K Taylor Cui MD    Subjective:     Principal Problem:Irritant contact dermatitis, unspecified cause        HPI:  The patient is a 59 y.o. female who presents with facial swelling and redness that began three days ago and is not alleviating. Patient also has redness and swelling over bilateral arms.  Her caregiver reports when she last saw the patient three days ago she was normal. The patient also reports lip swelling. The patient reports she has had this happen in the past. The patient is a resident at [facility]. The patient has a list of medications that she takes that includes Depakote, Invega, metformin, vitamin D, Pepto bismol, Ibuprofen, Zantac, Norvasc, Cogentin, Seroquel, and Zocor.    Overview/Hospital Course:  No notes on file    Interval History: patient reports the rash seems similar to yesterday, denies new meds.  Poor historian, at one point says has been there 3 months, then states a week. Seems that  and cleaning out the window AC unit are the only new things that have worsened it lately if it is acute on chronic. She told derm chlorophyll and plants are what she suspect did it. Denies itching or pain. Reports mild weeping. Denies any other areas of rashs. Denies new psych meds. Denies asthma history. Denies fever/chills. Labile at times and cries, states she wants it to go away. In bathroom frequently, she picks at the rash often during exam. Discussed with derm- start creams for rash as likely dermatitis of unknown cause but avoid home lotions/creams and do not suspect infectious. Discussed plans to monitor overnight and pending stability  to discharge back on home medications in next day or so and with creams recommended, they agree, no plans for bx of further inpatient work up, do not feel any meds are contributors.    Review of Systems   Constitutional: Negative for activity change and appetite change.   HENT: Positive for facial swelling. Negative for congestion and drooling.    Eyes: Negative for discharge and itching.   Respiratory: Negative for apnea, cough, chest tightness and shortness of breath.    Cardiovascular: Negative for chest pain and leg swelling.   Gastrointestinal: Negative for abdominal distention and abdominal pain.   Endocrine: Negative for cold intolerance and heat intolerance.   Genitourinary: Negative for difficulty urinating and dysuria.   Musculoskeletal: Positive for arthralgias.   Skin: Positive for rash. Negative for color change and pallor.   Neurological: Negative for dizziness and facial asymmetry.   Psychiatric/Behavioral: The patient is nervous/anxious.      Objective:     Vital Signs (Most Recent):  Temp: 99 °F (37.2 °C) (10/30/19 1105)  Pulse: 101 (10/30/19 1105)  Resp: 18 (10/30/19 1105)  BP: 137/65 (10/30/19 1105)  SpO2: (!) 94 % (10/30/19 1105) Vital Signs (24h Range):  Temp:  [98.5 °F (36.9 °C)-99 °F (37.2 °C)] 99 °F (37.2 °C)  Pulse:  [] 101  Resp:  [17-20] 18  SpO2:  [94 %-99 %] 94 %  BP: (137-185)/(65-86) 137/65     Weight: 88.8 kg (195 lb 12.3 oz)  Body mass index is 35.81 kg/m².    Intake/Output Summary (Last 24 hours) at 10/30/2019 1314  Last data filed at 10/30/2019 0000  Gross per 24 hour   Intake 480 ml   Output --   Net 480 ml      Physical Exam   Constitutional: She is oriented to person, place, and time. She appears well-developed and well-nourished.   HENT:   Head: Normocephalic and atraumatic.   Eyes: Pupils are equal, round, and reactive to light. EOM are normal.   Neck: Normal range of motion. Neck supple.   Cardiovascular: Normal rate, regular rhythm and normal heart sounds.    Pulmonary/Chest: Effort normal and breath sounds normal. No respiratory distress.   Abdominal: Soft. Bowel sounds are normal. She exhibits no distension. There is no tenderness.   Musculoskeletal: Normal range of motion. She exhibits no edema.   Neurological: She is oriented to person, place, and time. No cranial nerve deficit. Coordination normal.   Skin: Skin is warm and dry.   Peeling dry rash on dorsum of forearms with significant erythema and edema. More dry on right side with some scaling and right side more redness, mild areas of very small open areas on left, she is picking at the skin during exam and the dryness. Redness on left hand does not extend to posterior side. Swelling to hands and wrists but not tense and can move and make a first both hands..  No bleeding or purulent discharge noted.   Psychiatric: She has a normal mood and affect. Her behavior is normal.   Vitals reviewed.      Significant Labs: All pertinent labs within the past 24 hours have been reviewed.    Significant Imaging: I have reviewed and interpreted all pertinent imaging results/findings within the past 24 hours.      Assessment/Plan:      * Irritant contact dermatitis, unspecified cause  -rash on face and hands, wrists with unclear precipitant, seems likely from  but cannot rule out another environmental irritant or possible a lotion or topical irritant as well  -normal lactate, ck, esr, crp only small elevation. Wbc 11 without bands. Blood cx negative.   -tx for derm consult- feel is dermatitis of unclear origin also- recommend atarax PRN, clobetasol .05% ointment BID to palms, hands, forearms, for at least 2 weeks or until resolution, hydrocortisone 2.5% cream to face areas, and stopping home creams/lotions and using vaseline to areas of involvement.   -she is picking them also and has schizophrenia history, will have to be watched closely in group home to ensure she doesn't pick further at wounds.  -will watch  overnight, will likely take few days to see any significant improvements but want to ensure not going the opposite way and watch with creams overnight. No plans for bx, no concern for drug reaction per derm        Schizophrenia  -lives in group home, chronic issues, okay to restart home meds today (discussd with psych- none suspected to be causing rash)- depakoate, cogentin, seroquel, and invega injection at home  -group home coordinator will  patient as she has a history of trihng to leave hospitals and live on the street      Hyperlipidemia  -Holding statin for now       Essential hypertension  -Continue home norvasc      Leukocytosis  -Mild on admit and has resolved this morning  -no signs of superinfection, WBC 11, no fever. resolved        VTE Risk Mitigation (From admission, onward)         Ordered     enoxaparin injection 40 mg  Daily      10/28/19 2022     IP VTE HIGH RISK PATIENT  Once      10/28/19 2022     Place sequential compression device  Until discontinued      10/28/19 1913                Dispo: start creams recommended today, possible dc tomorrow, need to be picked up by group home due to flight risk with schizphrenia hx see sticky note    Taylor Cui MD  Department of Hospital Medicine   [facility]

## 2019-10-30 NOTE — ASSESSMENT & PLAN NOTE
Patient with unknown timing of eruption that is clinically consistent with irritant contact dermatitis. The eruption is in sparing areas on the face and in well demarcated areas on the palms, dorsal hands and forearms. 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 There is no suspicion for auto-immune, photoinduced, or drug induced eruption.  - Agree with Atarax PRN pruritus  - Recommend starting clobetasol 0.05% ointment BID to palms, hands, and forearms x2 weeks or until resolution  - Recommend starting hydrocortisone 2.5% cream to the areas of involvement on the face  - Recommend stopping pt's personal lotion and starting bland emollients such as Vaseline BID to areas of involvement

## 2019-10-30 NOTE — ASSESSMENT & PLAN NOTE
-rash on face and hands, wrists with unclear precipitant, seems likely from  but cannot rule out another environmental irritant or possible a lotion or topical irritant as well  -normal lactate, ck, esr, crp only small elevation. Wbc 11 without bands. Blood cx negative.   -tx for derm consult- feel is dermatitis of unclear origin also- recommend atarax PRN, clobetasol .05% ointment BID to palms, hands, forearms, for at least 2 weeks or until resolution, hydrocortisone 2.5% cream to face areas, and stopping home creams/lotions and using vaseline to areas of involvement.   -she is picking them also and has schizophrenia history, will have to be watched closely in group home to ensure she doesn't pick further at wounds.  -will watch overnight, will likely take few days to see any significant improvements but want to ensure not going the opposite way and watch with creams overnight. No plans for bx, no concern for drug reaction per derm

## 2019-10-30 NOTE — SUBJECTIVE & OBJECTIVE
Interval History: patient reports the rash seems similar to yesterday, denies new meds.  Poor historian, at one point says has been there 3 months, then states a week. Seems that  and cleaning out the window AC unit are the only new things that have worsened it lately if it is acute on chronic. She told derm chlorophyll and plants are what she suspect did it. Denies itching or pain. Reports mild weeping. Denies any other areas of rashs. Denies new psych meds. Denies asthma history. Denies fever/chills. Labile at times and cries, states she wants it to go away. In bathroom frequently, she picks at the rash often during exam. Discussed with derm- start creams for rash as likely dermatitis of unknown cause but avoid home lotions/creams and do not suspect infectious. Discussed plans to monitor overnight and pending stability to discharge back on home medications in next day or so and with creams recommended, they agree, no plans for bx of further inpatient work up, do not feel any meds are contributors.    Review of Systems   Constitutional: Negative for activity change and appetite change.   HENT: Positive for facial swelling. Negative for congestion and drooling.    Eyes: Negative for discharge and itching.   Respiratory: Negative for apnea, cough, chest tightness and shortness of breath.    Cardiovascular: Negative for chest pain and leg swelling.   Gastrointestinal: Negative for abdominal distention and abdominal pain.   Endocrine: Negative for cold intolerance and heat intolerance.   Genitourinary: Negative for difficulty urinating and dysuria.   Musculoskeletal: Positive for arthralgias.   Skin: Positive for rash. Negative for color change and pallor.   Neurological: Negative for dizziness and facial asymmetry.   Psychiatric/Behavioral: The patient is nervous/anxious.      Objective:     Vital Signs (Most Recent):  Temp: 99 °F (37.2 °C) (10/30/19 1105)  Pulse: 101 (10/30/19 1105)  Resp: 18 (10/30/19  1105)  BP: 137/65 (10/30/19 1105)  SpO2: (!) 94 % (10/30/19 1105) Vital Signs (24h Range):  Temp:  [98.5 °F (36.9 °C)-99 °F (37.2 °C)] 99 °F (37.2 °C)  Pulse:  [] 101  Resp:  [17-20] 18  SpO2:  [94 %-99 %] 94 %  BP: (137-185)/(65-86) 137/65     Weight: 88.8 kg (195 lb 12.3 oz)  Body mass index is 35.81 kg/m².    Intake/Output Summary (Last 24 hours) at 10/30/2019 1314  Last data filed at 10/30/2019 0000  Gross per 24 hour   Intake 480 ml   Output --   Net 480 ml      Physical Exam   Constitutional: She is oriented to person, place, and time. She appears well-developed and well-nourished.   HENT:   Head: Normocephalic and atraumatic.   Eyes: Pupils are equal, round, and reactive to light. EOM are normal.   Neck: Normal range of motion. Neck supple.   Cardiovascular: Normal rate, regular rhythm and normal heart sounds.   Pulmonary/Chest: Effort normal and breath sounds normal. No respiratory distress.   Abdominal: Soft. Bowel sounds are normal. She exhibits no distension. There is no tenderness.   Musculoskeletal: Normal range of motion. She exhibits no edema.   Neurological: She is oriented to person, place, and time. No cranial nerve deficit. Coordination normal.   Skin: Skin is warm and dry.   Peeling dry rash on dorsum of forearms with significant erythema and edema. More dry on right side with some scaling and right side more redness, mild areas of very small open areas on left, she is picking at the skin during exam and the dryness. Redness on left hand does not extend to posterior side. Swelling to hands and wrists but not tense and can move and make a first both hands..  No bleeding or purulent discharge noted.   Psychiatric: She has a normal mood and affect. Her behavior is normal.   Vitals reviewed.      Significant Labs: All pertinent labs within the past 24 hours have been reviewed.    Significant Imaging: I have reviewed and interpreted all pertinent imaging results/findings within the past 24  hours.

## 2019-10-30 NOTE — CONSULTS
"[facility]  Dermatology  Consult Note    Patient Name: Jazz Hernandez  MRN: [id number]  Admission Date: 10/28/2019  Hospital Length of Stay: 2 days  Attending Physician: Taylor Cui MD  Primary Care Provider: [facility]     Inpatient consult to Dermatology  Consult performed by: Jose Cardenas MD  Consult ordered by: Jahaira Quintanilla MD        Subjective:     Principal Problem:Irritant contact dermatitis, unspecified cause    HPI:  This is a 58yo F with schizophrenia presenting by way of txfer from [facility] for dermatology evaluation of 3mo h/o rash on the b/l dorsal forearms, palms, and face. Patient's first language is [place] but she is able to converse in English. She is tangential with her conversation and on exam. 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 Around 3 months ago, she was cleaning with her friends and the air conditioner turned on and released "a gas" that started making her arms and face burn. Since that time, she has had significant irritation in the areas. The timing of the onset of the eruption is questionable as she also states that it started just 4-5 days ago. Despite this time difference, she has only been applying an unknown lotion and HC cream to the area which have not been helping. She has not started any new medications prior to the onset of the eruption. She denies any sensitivity to the sun and denies any vision changes, oral ulcerations, joint or muscle aches, or abdominal pain. She lives by herself at home.    Past Medical History:   Diagnosis Date    Depression        History reviewed. No pertinent surgical history.  Family History     None        Tobacco Use    Smoking status: Current Every Day Smoker   Substance and Sexual Activity    Alcohol use: No    Drug use: No    Sexual activity: Not on file       Review of patient's allergies " indicates:  No Known Allergies    Medications:  Continuous Infusions:   lactated ringers 100 mL/hr at 10/29/19 2052     Scheduled Meds:   amLODIPine  5 mg Oral Daily    enoxaparin  40 mg Subcutaneous Daily     PRN Meds:acetaminophen, dextrose 50%, dextrose 50%, glucagon (human recombinant), glucose, glucose, hydrALAZINE, HYDROcodone-acetaminophen, HYDROmorphone, hydrOXYzine pamoate, insulin aspart U-100, sodium chloride 0.9%, sodium chloride 0.9%    Review of Systems   Constitutional: Negative for fever, chills, fatigue and malaise.   Eyes: Negative for itching, eye irritation and visual change.   Gastrointestinal: Negative for nausea, vomiting, abdominal pain and diarrhea.   Musculoskeletal: Negative for myalgias, joint swelling and arthralgias.   Skin: Positive for itching, rash and dry skin.     Objective:     Vital Signs (Most Recent):  Temp: 99 °F (37.2 °C) (10/30/19 0738)  Pulse: 107 (10/30/19 0738)  Resp: 18 (10/30/19 0738)  BP: (!) 185/86 (10/30/19 0738)  SpO2: 98 % (10/30/19 0738) Vital Signs (24h Range):  Temp:  [98.5 °F (36.9 °C)-99 °F (37.2 °C)] 99 °F (37.2 °C)  Pulse:  [] 107  Resp:  [17-20] 18  SpO2:  [95 %-99 %] 98 %  BP: (114-185)/(56-86) 185/86     Weight: 88.8 kg (195 lb 12.3 oz)  Body mass index is 35.81 kg/m².    Physical Exam   Constitutional: She appears well-developed and well-nourished.   Neurological: She is alert and oriented to person, place, and time.   Psychiatric: Her mood appears anxious.   Skin:   Areas Examined (abnormalities noted in diagram):   Scalp / Hair Palpated and Inspected  Head / Face Inspection Performed  Neck Inspection Performed  Chest / Axilla Inspection Performed  Abdomen Inspection Performed  Back Inspection Performed  RUE Inspected  LUE Inspection Performed  RLE Inspected  LLE Inspection Performed                           Significant Labs: None    Significant Imaging: None    Assessment/Plan:     * Irritant contact dermatitis, unspecified cause  Patient  with unknown timing of eruption that is clinically consistent with irritant contact dermatitis. The eruption is in sparing areas on the face and in well demarcated areas on the palms, dorsal hands and forearms. 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 There is no suspicion for auto-immune, photoinduced, or drug induced eruption.  - Agree with Atarax PRN pruritus  - Recommend starting clobetasol 0.05% ointment BID to palms, hands, and forearms x2 weeks or until resolution  - Recommend starting hydrocortisone 2.5% cream to the areas of involvement on the face  - Recommend stopping pt's personal lotion and starting bland emollients such as Vaseline BID to areas of involvement          Thank you for your consult. I will sign off. Please contact us if you have any additional questions.    Jose Cardenas MD  Dermatology  [facility]

## 2019-10-30 NOTE — PLAN OF CARE
10/30/19 0933   Post-Acute Status   Post-Acute Authorization Placement   Post-Acute Placement Status Awaiting Internal Medical Clearance

## 2019-10-30 NOTE — HPI
"This is a 60yo F with schizophrenia presenting by way of txfer from [facility] for dermatology evaluation of 3mo h/o rash on the b/l dorsal forearms, palms, and face. Patient's first language is Mosotho but she is able to converse in English. She is tangential with her conversation and on exam. 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 Around 3 months ago, she was cleaning with her friends and the air conditioner turned on and released "a gas" that started making her arms and face burn. Since that time, she has had significant irritation in the areas. The timing of the onset of the eruption is questionable as she also states that it started just 4-5 days ago. Despite this time difference, she has only been applying an unknown lotion and HC cream to the area which have not been helping. She has not started any new medications prior to the onset of the eruption. She denies any sensitivity to the sun and denies any vision changes, oral ulcerations, joint or muscle aches, or abdominal pain. She lives by herself at home.  "

## 2019-10-30 NOTE — ASSESSMENT & PLAN NOTE
-lives in group home, chronic issues, okay to restart home meds today (discussd with psych- none suspected to be causing rash)- depakoate, cogentin, seroquel, and invega injection at home  -group home coordinator will  patient as she has a history of trihng to leave hospitals and live on the street

## 2019-10-30 NOTE — ASSESSMENT & PLAN NOTE
-Mild on admit and has resolved this morning  -no signs of superinfection, WBC 11, no fever. resolved

## 2019-10-30 NOTE — PLAN OF CARE
Plan of care reviewed with patient. No acute changes overnight. Safety maintained, call light in reach, bed in lowest position, will continue to monitor.

## 2019-10-31 VITALS
BODY MASS INDEX: 36.02 KG/M2 | TEMPERATURE: 98 F | DIASTOLIC BLOOD PRESSURE: 83 MMHG | HEIGHT: 62 IN | RESPIRATION RATE: 17 BRPM | OXYGEN SATURATION: 99 % | WEIGHT: 195.75 LBS | SYSTOLIC BLOOD PRESSURE: 193 MMHG | HEART RATE: 100 BPM

## 2019-10-31 LAB
ALBUMIN SERPL BCP-MCNC: 3.5 G/DL (ref 3.5–5.2)
ALP SERPL-CCNC: 112 U/L (ref 55–135)
ALT SERPL W/O P-5'-P-CCNC: 35 U/L (ref 10–44)
ANION GAP SERPL CALC-SCNC: 7 MMOL/L (ref 8–16)
AST SERPL-CCNC: 25 U/L (ref 10–40)
BASOPHILS # BLD AUTO: 0.06 K/UL (ref 0–0.2)
BASOPHILS NFR BLD: 0.6 % (ref 0–1.9)
BILIRUB SERPL-MCNC: 0.2 MG/DL (ref 0.1–1)
BUN SERPL-MCNC: 8 MG/DL (ref 6–20)
CALCIUM SERPL-MCNC: 10 MG/DL (ref 8.7–10.5)
CHLORIDE SERPL-SCNC: 105 MMOL/L (ref 95–110)
CO2 SERPL-SCNC: 27 MMOL/L (ref 23–29)
CREAT SERPL-MCNC: 0.7 MG/DL (ref 0.5–1.4)
DIFFERENTIAL METHOD: ABNORMAL
EOSINOPHIL # BLD AUTO: 0.5 K/UL (ref 0–0.5)
EOSINOPHIL NFR BLD: 5.3 % (ref 0–8)
ERYTHROCYTE [DISTWIDTH] IN BLOOD BY AUTOMATED COUNT: 13.2 % (ref 11.5–14.5)
EST. GFR  (AFRICAN AMERICAN): >60 ML/MIN/1.73 M^2
EST. GFR  (NON AFRICAN AMERICAN): >60 ML/MIN/1.73 M^2
GLUCOSE SERPL-MCNC: 121 MG/DL (ref 70–110)
HCT VFR BLD AUTO: 31.7 % (ref 37–48.5)
HGB BLD-MCNC: 10.5 G/DL (ref 12–16)
IMM GRANULOCYTES # BLD AUTO: 0.11 K/UL (ref 0–0.04)
IMM GRANULOCYTES NFR BLD AUTO: 1.1 % (ref 0–0.5)
LYMPHOCYTES # BLD AUTO: 2.3 K/UL (ref 1–4.8)
LYMPHOCYTES NFR BLD: 24 % (ref 18–48)
MAGNESIUM SERPL-MCNC: 1.9 MG/DL (ref 1.6–2.6)
MCH RBC QN AUTO: 29.3 PG (ref 27–31)
MCHC RBC AUTO-ENTMCNC: 33.1 G/DL (ref 32–36)
MCV RBC AUTO: 89 FL (ref 82–98)
MONOCYTES # BLD AUTO: 1.4 K/UL (ref 0.3–1)
MONOCYTES NFR BLD: 14.1 % (ref 4–15)
NEUTROPHILS # BLD AUTO: 5.3 K/UL (ref 1.8–7.7)
NEUTROPHILS NFR BLD: 54.9 % (ref 38–73)
NRBC BLD-RTO: 0 /100 WBC
PLATELET # BLD AUTO: 264 K/UL (ref 150–350)
PMV BLD AUTO: 12.8 FL (ref 9.2–12.9)
POTASSIUM SERPL-SCNC: 4.4 MMOL/L (ref 3.5–5.1)
PROT SERPL-MCNC: 7 G/DL (ref 6–8.4)
RBC # BLD AUTO: 3.58 M/UL (ref 4–5.4)
SODIUM SERPL-SCNC: 139 MMOL/L (ref 136–145)
WBC # BLD AUTO: 9.59 K/UL (ref 3.9–12.7)

## 2019-10-31 PROCEDURE — 99239 HOSP IP/OBS DSCHRG MGMT >30: CPT | Mod: ,,, | Performed by: HOSPITALIST

## 2019-10-31 PROCEDURE — 36415 COLL VENOUS BLD VENIPUNCTURE: CPT

## 2019-10-31 PROCEDURE — 99239 PR HOSPITAL DISCHARGE DAY,>30 MIN: ICD-10-PCS | Mod: ,,, | Performed by: HOSPITALIST

## 2019-10-31 PROCEDURE — 80053 COMPREHEN METABOLIC PANEL: CPT

## 2019-10-31 PROCEDURE — 63600175 PHARM REV CODE 636 W HCPCS: Performed by: HOSPITALIST

## 2019-10-31 PROCEDURE — 83735 ASSAY OF MAGNESIUM: CPT

## 2019-10-31 PROCEDURE — 25000003 PHARM REV CODE 250: Performed by: NURSE PRACTITIONER

## 2019-10-31 PROCEDURE — 85025 COMPLETE CBC W/AUTO DIFF WBC: CPT

## 2019-10-31 PROCEDURE — 25000003 PHARM REV CODE 250: Performed by: HOSPITALIST

## 2019-10-31 RX ORDER — CLOBETASOL PROPIONATE 0.05 MG/G
GEL TOPICAL 2 TIMES DAILY
Qty: 30 G | Refills: 3 | Status: SHIPPED | OUTPATIENT
Start: 2019-10-31 | End: 2019-10-31 | Stop reason: HOSPADM

## 2019-10-31 RX ORDER — HYDROXYZINE PAMOATE 25 MG/1
25 CAPSULE ORAL EVERY 8 HOURS PRN
Qty: 60 CAPSULE | Refills: 2 | Status: SHIPPED | OUTPATIENT
Start: 2019-10-31

## 2019-10-31 RX ORDER — PETROLATUM,WHITE
OINTMENT IN PACKET (GRAM) TOPICAL
Refills: 0 | COMMUNITY
Start: 2019-10-31

## 2019-10-31 RX ORDER — ACETAMINOPHEN 325 MG/1
650 TABLET ORAL EVERY 4 HOURS PRN
Refills: 0 | COMMUNITY
Start: 2019-10-31

## 2019-10-31 RX ORDER — BETAMETHASONE DIPROPIONATE 0.5 MG/G
OINTMENT TOPICAL 2 TIMES DAILY
Qty: 45 G | Refills: 3 | Status: SHIPPED | OUTPATIENT
Start: 2019-10-31

## 2019-10-31 RX ORDER — HYDROCORTISONE 25 MG/G
CREAM TOPICAL 2 TIMES DAILY
Qty: 28 G | Refills: 3 | Status: SHIPPED | OUTPATIENT
Start: 2019-10-31

## 2019-10-31 RX ADMIN — DIVALPROEX SODIUM 250 MG: 250 TABLET, DELAYED RELEASE ORAL at 05:10

## 2019-10-31 RX ADMIN — SODIUM CHLORIDE, SODIUM LACTATE, POTASSIUM CHLORIDE, AND CALCIUM CHLORIDE: .6; .31; .03; .02 INJECTION, SOLUTION INTRAVENOUS at 08:10

## 2019-10-31 RX ADMIN — HYDROCORTISONE: 25 CREAM TOPICAL at 08:10

## 2019-10-31 RX ADMIN — AMLODIPINE BESYLATE 5 MG: 5 TABLET ORAL at 08:10

## 2019-10-31 RX ADMIN — CLOBETASOL PROPIONATE: 0.5 OINTMENT TOPICAL at 08:10

## 2019-10-31 RX ADMIN — BENZTROPINE MESYLATE 0.5 MG: 0.5 TABLET ORAL at 08:10

## 2019-10-31 NOTE — PLAN OF CARE
Pt d/c today, returning to group home. CM contacted Fay Hardwick re: d/c, will have nurse contact when ready fopr d/c. CM notified Radha RN d/c order rec'd, prov'd contact, Fay Hardwick [phone], Ms Hardwick req'd call when pt 30 mins from d/c,  pkup location (front of [facility]).    JON - Candace HERNANDEZ [phone]       10/31/19 1037   Final Note   Assessment Type Final Discharge Note   Right Care Referral Info   Post Acute Recommendation No Care

## 2019-10-31 NOTE — NURSING
Discharge instructions reviewed with patient, pt verbalized understanding. All LDA's removed, patient tolerated well.

## 2019-10-31 NOTE — DISCHARGE SUMMARY
DISCHARGE SUMMARY  Hospital Medicine    Team: [facility]OS[facility] MED K    Patient Name: Jazz Hernandez  YOB: 1960    Admit Date: 10/28/2019    Discharge Date: 10/31/2019    Discharge Attending Physician: Taylor Cui MD     Principal Diagnoses:  Active Hospital Problems    Diagnosis  POA    *Irritant contact dermatitis, unspecified cause [L24.9]  Yes    Schizophrenia [F20.9]  Yes    Leukocytosis [D72.829]  Yes    Essential hypertension [I10]  Yes    Hyperlipidemia [E78.5]  Yes      Resolved Hospital Problems   No resolved problems to display.       Discharged Condition: good     Interval History: she reports feeling much better this morning. Rash is improving on both hands with much less redness, swelling per her opinion and on my exam as well. She states itching has improved and ROM of hands has improved. On exam, significant improving in redness, swelling, and wrinkling present on both hands left hand had worsening on admit and it is improving from yesterday as well. Denies fever/chills or other new issues. Discussed group home will come to pick her up today and will have bedside delivery of creams and she is to stop using any of the creams she was using at home prior to admission. Copied all of the dermatology instructions from their note into her discharge instructions as well for patient. She is to continue all her oral medications she was on before. She voiced understanding. CM/SW to call caretakers to pick her up and will f/u with PCP. Advised her to use gloves to the wrists and to avoid any cleaning activiteis until rash is resolved and to avoid any cleaning fumes in general as we are still unsure if cleaning solution caused this but to avoid in interim.    Temp:  [97.6 °F (36.4 °C)-99 °F (37.2 °C)]   Pulse:  []   Resp:  [17-18]   BP: (114-140)/(57-70)   SpO2:  [92 %-96 %]      Physical Exam   Constitutional: She is oriented to person, place, and time. She appears  well-developed and well-nourished.   HENT:   Head: Normocephalic and atraumatic.   Eyes: Pupils are equal, round, and reactive to light. EOM are normal.   Neck: Normal range of motion. Neck supple.   Cardiovascular: Normal rate, regular rhythm and normal heart sounds.   Pulmonary/Chest: Effort normal and breath sounds normal. No respiratory distress.   Abdominal: Soft. Bowel sounds are normal. She exhibits no distension. There is no tenderness.   Musculoskeletal: Normal range of motion. She exhibits no edema.   Neurological: She is oriented to person, place, and time. No cranial nerve deficit. Coordination normal.   Skin: Skin is warm and dry.   Peeling dry rash on dorsum of forearms with improving erythema and edema. Dryness on right side with some scaling and mild peeling and right side more redness, mild areas of very small open areas on left. Much improved swelling, redness, erythema on left hand, arm and wrist from prior exam and healing areas noted and weeping nearly resolved.  Psychiatric: She has a normal mood and affect. Her behavior is normal.   Vitals reviewed.      HOSPITAL COURSE:      Initial Presentation:    The patient is a 59 y.o. female who presents with facial swelling and redness that began three days ago and is not alleviating. Patient also has redness and swelling over bilateral arms.  Her caregiver reports when she last saw the patient three days ago she was normal. The patient also reports lip swelling. The patient reports she has had this happen in the past. The patient is a resident at [facility]. The patient has a list of medications that she takes that includes Depakote, Invega, metformin, vitamin D, Pepto bismol, Ibuprofen, Zantac, Norvasc, Cogentin, Seroquel, and Zocor.    Course of Principle Problem for Admission:    Irritant contact dermatitis, unspecified cause  -rash on face and hands, wrists with unclear precipitant, seems likely from  but cannot rule out another  environmental irritant or possible a lotion or topical irritant as well   -normal lactate, ck, esr, crp only small elevation. Wbc 11 without bands. Blood cx negative.   -tx for derm consult- feel is dermatitis of unclear origin also- recommend atarax PRN, clobetasol .05% ointment BID to palms, hands, forearms, for at least 2 weeks or until resolution, hydrocortisone 2.5% cream to face areas, and stopping home creams/lotions and using vaseline to areas of involvement.   -much improved overnight on day of discharge after 1 day of creams, will discharge with creams, change from clobetasol to betathosome cream per pharmacy as equal in efficacy as this one is on insurance formluary and covered and other is not on formulary for discharge.      Other Medical Problems Addressed in the Hospital:    Schizophrenia  -lives in group home, chronic issues, continue home meds today (discussd with psych- none suspected to be causing rash)- depakoate, cogentin, seroquel, and invega injection at home  -group home coordinator will  patient as she has a history of trihng to leave hospitals and live [place] the street        Hyperlipidemia  -cont statin on discharge        Essential hypertension  -Continue home norvasc        Leukocytosis  -Mild on admit and has resolved this morning  -no signs of superinfection, WBC 11, no fever. resolved      Consults: Dermatology    Last CBC/BMP:    CBC/Anemia Labs: Coags:    Recent Labs   Lab 10/29/19  0559 10/30/19  0443 10/31/19  0500   WBC 10.63 11.57 9.59   HGB 10.0* 11.0* 10.5*   HCT 30.9* 35.3* 31.7*    253 264   MCV 91 93 89   RDW 13.2 13.4 13.2    Recent Labs   Lab 10/28/19  0922   INR 0.9        Chemistries:   Recent Labs   Lab 10/29/19  0559 10/30/19  0443 10/31/19  0500    140 139   K 4.1 4.4 4.4    105 105   CO2 25 25 27   BUN 5* 9 8   CREATININE 0.6 0.8 0.7   CALCIUM 8.8 9.7 10.0   PROT 6.1 7.1 7.0   BILITOT 0.2 0.2 0.2   ALKPHOS 85 121 112   ALT 18 29 35   AST 18 29  25   MG 1.9 1.9 1.9   PHOS 3.6  --   --               Special Treatments/Procedures:   * No surgery found *     Disposition: Home or Self Care      Future Scheduled Appointments:  No future appointments.    Follow-up Plans from This Hospitalization:  folllow up with PCP in 7 days    Discharge Medication List:     Jazz Hernandez   Home Medication Instructions MACK:[id number]    Printed on:10/31/19 5897   Medication Information                      acetaminophen (TYLENOL) 325 MG tablet  Take 2 tablets (650 mg total) by mouth every 4 (four) hours as needed.             AMLODIPINE BESYLATE (NORVASC ORAL)  Take 5 mg by mouth.              benztropine (COGENTIN) 0.5 MG tablet  Take 0.5 mg by mouth 2 (two) times daily.             betamethasone dipropionate (DIPROLENE) 0.05 % ointment  Apply topically 2 (two) times daily.             bismuth subsalicylate (PEPTO BISMOL) 262 mg/15 mL suspension  Take 15 mLs by mouth every 6 (six) hours as needed for Indigestion.             divalproex (DEPAKOTE) 500 MG TbEC  Take 250 mg by mouth every 8 (eight) hours.             hydrocortisone 2.5 % cream  Apply topically 2 (two) times daily. To facial involvement of rash until resolution             hydrOXYzine pamoate (VISTARIL) 25 MG Cap  Take 1 capsule (25 mg total) by mouth every 8 (eight) hours as needed (itching).             ibuprofen (ADVIL,MOTRIN) 600 MG tablet  Take 1 tablet (600 mg total) by mouth every 6 (six) hours as needed for Pain.             metFORMIN (GLUCOPHAGE) 500 MG tablet  Take 500 mg by mouth 2 (two) times daily with meals.             PALIPERIDONE PALMITATE (INVEGA SUSTENNA IM)  Inject into the muscle.             QUEtiapine (SEROQUEL) 300 MG Tab  Take 350 mg by mouth.             ranitidine (ZANTAC) 150 MG capsule  Take 150 mg by mouth 2 (two) times daily.             simvastatin (ZOCOR) 40 MG tablet  Take 40 mg by mouth every evening.             white petrolatum (VASELINE) ointment  Apply topically as  needed for Dry Skin. Once a day to rash on hands, and arms.                 Patient Instructions:  Discharge Procedure Orders   Diet diabetic     Other restrictions (specify):   Order Comments: Do not use cleaning products or use hands strenuously for household work until arm rash has resolved. Use elbow length gloves in the future when cleaning household items or using any cleaning products, avoid cleaning product fumes in the face as much as possible and use sparingly when cleaning     Notify your health care provider if you experience any of the following:  temperature >100.4     Notify your health care provider if you experience any of the following:  worsening rash     Activity as tolerated       At the time of discharge patient was told to take all medications as prescribed, to keep all followup appointments, and to call their primary care physician or return to the emergency room if they have any worsening or concerning symptoms.    Signing Physician:  Taylor Cui MD

## 2019-10-31 NOTE — DISCHARGE INSTRUCTIONS
Instructions by Dermatology  On discharge:    Irritant contact dermatitis, unspecified cause  Patient with unknown timing of eruption that is clinically consistent with irritant contact dermatitis. The eruption is in sparing areas on the face and in well demarcated areas on the palms, dorsal hands and forearms. 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 There is no suspicion for auto-immune, photoinduced, or drug induced eruption.  Instructions:  -Continue Atarax pills as needed for itching associated with the rash.  NEW creams for the rash:  - Recommend betamethasone 0.05% ointment apply twice a day to palms, hands, and forearms  For at least 2 weeks or longer if you need until it resolves  - Recommend hydrocortisone 2.5% cream to the areas of rash involvement on the face, continue until it resolves  - Recommend stopping your personal lotion and starting  Vaseline BID to areas of involvement of the rash, continue until it resolves

## 2019-11-02 LAB
BACTERIA BLD CULT: NORMAL
BACTERIA BLD CULT: NORMAL

## 2021-04-05 NOTE — PLAN OF CARE
LMOM Patient AAOx4, VS stable. POC reviewed with patient, pain and itching managed well with topical and oral meds. Safety maintained throughout shift, no acute changes. WCTM

## 2025-04-08 NOTE — ED NOTES
CBG 80   [Erythematous Oropharynx] : erythematous oropharynx [NL] : warm, clear [de-identified] : slight pink area around nail bed on right thumb